# Patient Record
Sex: FEMALE | Race: BLACK OR AFRICAN AMERICAN | NOT HISPANIC OR LATINO | Employment: FULL TIME | RURAL
[De-identification: names, ages, dates, MRNs, and addresses within clinical notes are randomized per-mention and may not be internally consistent; named-entity substitution may affect disease eponyms.]

---

## 2020-05-30 ENCOUNTER — HISTORICAL (OUTPATIENT)
Dept: ADMINISTRATIVE | Facility: HOSPITAL | Age: 50
End: 2020-05-30

## 2020-06-24 ENCOUNTER — HISTORICAL (OUTPATIENT)
Dept: ADMINISTRATIVE | Facility: HOSPITAL | Age: 50
End: 2020-06-24

## 2020-08-31 ENCOUNTER — HISTORICAL (OUTPATIENT)
Dept: ADMINISTRATIVE | Facility: HOSPITAL | Age: 50
End: 2020-08-31

## 2021-04-16 ENCOUNTER — OFFICE VISIT (OUTPATIENT)
Dept: FAMILY MEDICINE | Facility: CLINIC | Age: 51
End: 2021-04-16
Payer: COMMERCIAL

## 2021-04-16 VITALS — RESPIRATION RATE: 17 BRPM | TEMPERATURE: 99 F | BODY MASS INDEX: 35.36 KG/M2 | WEIGHT: 220 LBS | HEIGHT: 66 IN

## 2021-04-16 DIAGNOSIS — R07.81 RIB PAIN ON LEFT SIDE: ICD-10-CM

## 2021-04-16 DIAGNOSIS — M94.0 COSTOCHONDRITIS: Primary | ICD-10-CM

## 2021-04-16 PROCEDURE — 3008F BODY MASS INDEX DOCD: CPT | Mod: CPTII,,, | Performed by: FAMILY MEDICINE

## 2021-04-16 PROCEDURE — 99213 OFFICE O/P EST LOW 20 MIN: CPT | Mod: 25,,, | Performed by: FAMILY MEDICINE

## 2021-04-16 PROCEDURE — 1125F PR PAIN SEVERITY QUANTIFIED, PAIN PRESENT: ICD-10-PCS | Mod: ,,, | Performed by: FAMILY MEDICINE

## 2021-04-16 PROCEDURE — 96372 PR INJECTION,THERAP/PROPH/DIAG2ST, IM OR SUBCUT: ICD-10-PCS | Mod: ,,, | Performed by: FAMILY MEDICINE

## 2021-04-16 PROCEDURE — 99213 PR OFFICE/OUTPT VISIT, EST, LEVL III, 20-29 MIN: ICD-10-PCS | Mod: 25,,, | Performed by: FAMILY MEDICINE

## 2021-04-16 PROCEDURE — 3008F PR BODY MASS INDEX (BMI) DOCUMENTED: ICD-10-PCS | Mod: CPTII,,, | Performed by: FAMILY MEDICINE

## 2021-04-16 PROCEDURE — 96372 THER/PROPH/DIAG INJ SC/IM: CPT | Mod: ,,, | Performed by: FAMILY MEDICINE

## 2021-04-16 PROCEDURE — 1125F AMNT PAIN NOTED PAIN PRSNT: CPT | Mod: ,,, | Performed by: FAMILY MEDICINE

## 2021-04-16 RX ORDER — DEXAMETHASONE SODIUM PHOSPHATE 4 MG/ML
4 INJECTION, SOLUTION INTRA-ARTICULAR; INTRALESIONAL; INTRAMUSCULAR; INTRAVENOUS; SOFT TISSUE
Status: COMPLETED | OUTPATIENT
Start: 2021-04-16 | End: 2021-04-16

## 2021-04-16 RX ORDER — IBUPROFEN 800 MG/1
800 TABLET ORAL 3 TIMES DAILY PRN
Qty: 20 TABLET | Refills: 0 | Status: SHIPPED | OUTPATIENT
Start: 2021-04-16 | End: 2022-02-21 | Stop reason: ALTCHOICE

## 2021-04-16 RX ORDER — PREDNISONE 20 MG/1
20 TABLET ORAL DAILY
Qty: 5 TABLET | Refills: 0 | Status: SHIPPED | OUTPATIENT
Start: 2021-04-16 | End: 2021-04-21

## 2021-04-16 RX ORDER — KETOROLAC TROMETHAMINE 30 MG/ML
60 INJECTION, SOLUTION INTRAMUSCULAR; INTRAVENOUS
Status: COMPLETED | OUTPATIENT
Start: 2021-04-16 | End: 2021-04-16

## 2021-04-16 RX ORDER — LISINOPRIL 10 MG/1
10 TABLET ORAL DAILY
COMMUNITY
End: 2021-06-04 | Stop reason: SDUPTHER

## 2021-04-16 RX ORDER — TIZANIDINE 4 MG/1
4 TABLET ORAL 3 TIMES DAILY PRN
Qty: 20 TABLET | Refills: 0 | Status: SHIPPED | OUTPATIENT
Start: 2021-04-16 | End: 2021-04-26

## 2021-04-16 RX ADMIN — KETOROLAC TROMETHAMINE 60 MG: 30 INJECTION, SOLUTION INTRAMUSCULAR; INTRAVENOUS at 04:04

## 2021-04-16 RX ADMIN — DEXAMETHASONE SODIUM PHOSPHATE 4 MG: 4 INJECTION, SOLUTION INTRA-ARTICULAR; INTRALESIONAL; INTRAMUSCULAR; INTRAVENOUS; SOFT TISSUE at 04:04

## 2021-06-04 ENCOUNTER — TELEPHONE (OUTPATIENT)
Dept: FAMILY MEDICINE | Facility: CLINIC | Age: 51
End: 2021-06-04

## 2021-06-04 ENCOUNTER — OFFICE VISIT (OUTPATIENT)
Dept: FAMILY MEDICINE | Facility: CLINIC | Age: 51
End: 2021-06-04
Payer: COMMERCIAL

## 2021-06-04 VITALS
DIASTOLIC BLOOD PRESSURE: 96 MMHG | BODY MASS INDEX: 37.77 KG/M2 | WEIGHT: 235 LBS | TEMPERATURE: 99 F | HEART RATE: 75 BPM | OXYGEN SATURATION: 96 % | HEIGHT: 66 IN | SYSTOLIC BLOOD PRESSURE: 160 MMHG

## 2021-06-04 DIAGNOSIS — R30.0 DYSURIA: Primary | ICD-10-CM

## 2021-06-04 DIAGNOSIS — N32.81 OVERACTIVE BLADDER: ICD-10-CM

## 2021-06-04 LAB
BACTERIA #/AREA URNS HPF: ABNORMAL /HPF
BILIRUB UR QL STRIP: NEGATIVE
CLARITY UR: ABNORMAL
COLOR UR: YELLOW
GLUCOSE UR STRIP-MCNC: NEGATIVE MG/DL
KETONES UR STRIP-SCNC: NEGATIVE MG/DL
LEUKOCYTE ESTERASE UR QL STRIP: ABNORMAL
NITRITE UR QL STRIP: NEGATIVE
PH UR STRIP: 6.5 PH UNITS
PROT UR QL STRIP: ABNORMAL
RBC # UR STRIP: ABNORMAL /UL
RBC #/AREA URNS HPF: ABNORMAL /HPF
SP GR UR STRIP: 1.01
SQUAMOUS #/AREA URNS LPF: ABNORMAL /LPF
UROBILINOGEN UR STRIP-ACNC: 0.2 MG/DL
WBC #/AREA URNS HPF: ABNORMAL /HPF

## 2021-06-04 PROCEDURE — 99214 PR OFFICE/OUTPT VISIT, EST, LEVL IV, 30-39 MIN: ICD-10-PCS | Mod: ,,, | Performed by: FAMILY MEDICINE

## 2021-06-04 PROCEDURE — 81001 URINALYSIS AUTO W/SCOPE: CPT | Mod: ,,, | Performed by: CLINICAL MEDICAL LABORATORY

## 2021-06-04 PROCEDURE — 3008F BODY MASS INDEX DOCD: CPT | Mod: CPTII,,, | Performed by: FAMILY MEDICINE

## 2021-06-04 PROCEDURE — 87086 CULTURE, URINE: ICD-10-PCS | Mod: ,,, | Performed by: CLINICAL MEDICAL LABORATORY

## 2021-06-04 PROCEDURE — 81001 URINALYSIS, REFLEX TO URINE CULTURE: ICD-10-PCS | Mod: ,,, | Performed by: CLINICAL MEDICAL LABORATORY

## 2021-06-04 PROCEDURE — 3008F PR BODY MASS INDEX (BMI) DOCUMENTED: ICD-10-PCS | Mod: CPTII,,, | Performed by: FAMILY MEDICINE

## 2021-06-04 PROCEDURE — 87186 CULTURE, URINE: ICD-10-PCS | Mod: ,,, | Performed by: CLINICAL MEDICAL LABORATORY

## 2021-06-04 PROCEDURE — 87077 CULTURE, URINE: ICD-10-PCS | Mod: ,,, | Performed by: CLINICAL MEDICAL LABORATORY

## 2021-06-04 PROCEDURE — 87186 SC STD MICRODIL/AGAR DIL: CPT | Mod: ,,, | Performed by: CLINICAL MEDICAL LABORATORY

## 2021-06-04 PROCEDURE — 87077 CULTURE AEROBIC IDENTIFY: CPT | Mod: ,,, | Performed by: CLINICAL MEDICAL LABORATORY

## 2021-06-04 PROCEDURE — 99214 OFFICE O/P EST MOD 30 MIN: CPT | Mod: ,,, | Performed by: FAMILY MEDICINE

## 2021-06-04 PROCEDURE — 87086 URINE CULTURE/COLONY COUNT: CPT | Mod: ,,, | Performed by: CLINICAL MEDICAL LABORATORY

## 2021-06-04 RX ORDER — OXYBUTYNIN CHLORIDE 5 MG/1
5 TABLET, EXTENDED RELEASE ORAL DAILY
Qty: 30 TABLET | Refills: 2 | Status: SHIPPED | OUTPATIENT
Start: 2021-06-04 | End: 2022-02-21 | Stop reason: ALTCHOICE

## 2021-06-04 RX ORDER — PHENAZOPYRIDINE HYDROCHLORIDE 100 MG/1
100 TABLET, FILM COATED ORAL 3 TIMES DAILY PRN
Qty: 12 TABLET | Refills: 0 | Status: SHIPPED | OUTPATIENT
Start: 2021-06-04 | End: 2022-02-21 | Stop reason: ALTCHOICE

## 2021-06-04 RX ORDER — LISINOPRIL 10 MG/1
10 TABLET ORAL DAILY
Qty: 90 TABLET | Refills: 0 | Status: SHIPPED | OUTPATIENT
Start: 2021-06-04 | End: 2022-02-21 | Stop reason: SDUPTHER

## 2021-06-06 LAB — UA COMPLETE W REFLEX CULTURE PNL UR: ABNORMAL

## 2021-06-07 ENCOUNTER — TELEPHONE (OUTPATIENT)
Dept: FAMILY MEDICINE | Facility: CLINIC | Age: 51
End: 2021-06-07

## 2021-06-07 RX ORDER — NITROFURANTOIN 25; 75 MG/1; MG/1
100 CAPSULE ORAL 2 TIMES DAILY
Qty: 14 CAPSULE | Refills: 0 | Status: SHIPPED | OUTPATIENT
Start: 2021-06-07 | End: 2022-02-21 | Stop reason: ALTCHOICE

## 2021-10-06 ENCOUNTER — HOSPITAL ENCOUNTER (OUTPATIENT)
Dept: RADIOLOGY | Facility: HOSPITAL | Age: 51
Discharge: HOME | End: 2021-10-06
Attending: ORTHOPAEDIC SURGERY
Payer: COMMERCIAL

## 2021-10-06 DIAGNOSIS — M25.562 ACUTE PAIN OF LEFT KNEE: ICD-10-CM

## 2021-10-06 PROBLEM — M23.92 INTERNAL DERANGEMENT OF LEFT KNEE: Status: ACTIVE | Noted: 2021-10-06

## 2021-10-06 PROCEDURE — 73564 X-RAY EXAM KNEE 4 OR MORE: CPT | Mod: TC,LT

## 2021-12-03 ENCOUNTER — OFFICE VISIT (OUTPATIENT)
Dept: FAMILY MEDICINE | Facility: CLINIC | Age: 51
End: 2021-12-03
Payer: COMMERCIAL

## 2021-12-03 VITALS
SYSTOLIC BLOOD PRESSURE: 160 MMHG | HEART RATE: 94 BPM | BODY MASS INDEX: 36.16 KG/M2 | DIASTOLIC BLOOD PRESSURE: 100 MMHG | OXYGEN SATURATION: 97 % | WEIGHT: 225 LBS | HEIGHT: 66 IN | TEMPERATURE: 97 F

## 2021-12-03 DIAGNOSIS — J32.9 SINUSITIS, UNSPECIFIED CHRONICITY, UNSPECIFIED LOCATION: ICD-10-CM

## 2021-12-03 DIAGNOSIS — K59.00 CONSTIPATION, UNSPECIFIED CONSTIPATION TYPE: Primary | ICD-10-CM

## 2021-12-03 DIAGNOSIS — R14.0 ABDOMINAL BLOATING: ICD-10-CM

## 2021-12-03 PROCEDURE — 4010F PR ACE/ARB THEARPY RXD/TAKEN: ICD-10-PCS | Mod: CPTII,,, | Performed by: NURSE PRACTITIONER

## 2021-12-03 PROCEDURE — 99213 OFFICE O/P EST LOW 20 MIN: CPT | Mod: ,,, | Performed by: NURSE PRACTITIONER

## 2021-12-03 PROCEDURE — 99213 PR OFFICE/OUTPT VISIT, EST, LEVL III, 20-29 MIN: ICD-10-PCS | Mod: ,,, | Performed by: NURSE PRACTITIONER

## 2021-12-03 PROCEDURE — 4010F ACE/ARB THERAPY RXD/TAKEN: CPT | Mod: CPTII,,, | Performed by: NURSE PRACTITIONER

## 2021-12-03 RX ORDER — CEFDINIR 300 MG/1
300 CAPSULE ORAL 2 TIMES DAILY
Qty: 20 CAPSULE | Refills: 0 | Status: SHIPPED | OUTPATIENT
Start: 2021-12-03 | End: 2021-12-13

## 2021-12-27 ENCOUNTER — CLINICAL SUPPORT (OUTPATIENT)
Dept: CARDIOLOGY | Facility: CLINIC | Age: 51
End: 2021-12-27
Payer: COMMERCIAL

## 2021-12-27 DIAGNOSIS — Z01.810 PRE-OPERATIVE CARDIOVASCULAR EXAMINATION: ICD-10-CM

## 2021-12-27 PROCEDURE — 93010 ELECTROCARDIOGRAM REPORT: CPT | Mod: S$PBB,,, | Performed by: INTERNAL MEDICINE

## 2021-12-27 PROCEDURE — 93010 EKG 12-LEAD: ICD-10-PCS | Mod: S$PBB,,, | Performed by: INTERNAL MEDICINE

## 2021-12-27 PROCEDURE — 93005 ELECTROCARDIOGRAM TRACING: CPT | Mod: PBBFAC | Performed by: INTERNAL MEDICINE

## 2021-12-27 PROCEDURE — 99212 OFFICE O/P EST SF 10 MIN: CPT | Mod: PBBFAC

## 2022-01-03 RX ORDER — COLCHICINE 0.6 MG/1
1 CAPSULE ORAL DAILY
COMMUNITY
Start: 2021-09-07 | End: 2022-02-21 | Stop reason: ALTCHOICE

## 2022-01-03 RX ORDER — METHYLPREDNISOLONE 4 MG/1
TABLET ORAL
COMMUNITY
Start: 2021-08-18 | End: 2022-02-21 | Stop reason: ALTCHOICE

## 2022-01-03 RX ORDER — UREA 40 %
CREAM (GRAM) TOPICAL
COMMUNITY
Start: 2021-09-09 | End: 2022-02-21 | Stop reason: ALTCHOICE

## 2022-01-03 RX ORDER — MELOXICAM 7.5 MG/1
7.5 TABLET ORAL DAILY
COMMUNITY
Start: 2021-09-07 | End: 2022-02-21 | Stop reason: ALTCHOICE

## 2022-01-04 ENCOUNTER — HOSPITAL ENCOUNTER (OUTPATIENT)
Facility: HOSPITAL | Age: 52
Discharge: HOME | End: 2022-01-04
Attending: ORTHOPAEDIC SURGERY | Admitting: ORTHOPAEDIC SURGERY
Payer: COMMERCIAL

## 2022-01-04 ENCOUNTER — ANESTHESIA EVENT (OUTPATIENT)
Dept: SURGERY | Facility: HOSPITAL | Age: 52
End: 2022-01-04
Payer: COMMERCIAL

## 2022-01-04 ENCOUNTER — ANESTHESIA (OUTPATIENT)
Dept: SURGERY | Facility: HOSPITAL | Age: 52
End: 2022-01-04
Payer: COMMERCIAL

## 2022-01-04 VITALS
BODY MASS INDEX: 38.12 KG/M2 | OXYGEN SATURATION: 100 % | SYSTOLIC BLOOD PRESSURE: 131 MMHG | HEART RATE: 93 BPM | TEMPERATURE: 97 F | DIASTOLIC BLOOD PRESSURE: 84 MMHG | WEIGHT: 237.19 LBS | RESPIRATION RATE: 16 BRPM | HEIGHT: 66 IN

## 2022-01-04 DIAGNOSIS — S83.242A ACUTE MEDIAL MENISCUS TEAR OF LEFT KNEE: ICD-10-CM

## 2022-01-04 PROCEDURE — 71000033 HC RECOVERY, INTIAL HOUR: Performed by: ORTHOPAEDIC SURGERY

## 2022-01-04 PROCEDURE — 27201423 OPTIME MED/SURG SUP & DEVICES STERILE SUPPLY: Performed by: ORTHOPAEDIC SURGERY

## 2022-01-04 PROCEDURE — D9220A PRA ANESTHESIA: Mod: ANES,,, | Performed by: ANESTHESIOLOGY

## 2022-01-04 PROCEDURE — 71000015 HC POSTOP RECOV 1ST HR: Performed by: ORTHOPAEDIC SURGERY

## 2022-01-04 PROCEDURE — 36000711: Performed by: ORTHOPAEDIC SURGERY

## 2022-01-04 PROCEDURE — 63600175 PHARM REV CODE 636 W HCPCS: Performed by: NURSE ANESTHETIST, CERTIFIED REGISTERED

## 2022-01-04 PROCEDURE — 97161 PT EVAL LOW COMPLEX 20 MIN: CPT

## 2022-01-04 PROCEDURE — 27100168 OPTIME MED/SURG SUP & DEVICES NON-STERILE SUPPLY: Performed by: ORTHOPAEDIC SURGERY

## 2022-01-04 PROCEDURE — D9220A PRA ANESTHESIA: Mod: CRNA,,, | Performed by: NURSE ANESTHETIST, CERTIFIED REGISTERED

## 2022-01-04 PROCEDURE — 71000016 HC POSTOP RECOV ADDL HR: Performed by: ORTHOPAEDIC SURGERY

## 2022-01-04 PROCEDURE — 37000009 HC ANESTHESIA EA ADD 15 MINS: Performed by: ORTHOPAEDIC SURGERY

## 2022-01-04 PROCEDURE — D9220A PRA ANESTHESIA: ICD-10-PCS | Mod: ANES,,, | Performed by: ANESTHESIOLOGY

## 2022-01-04 PROCEDURE — 25000003 PHARM REV CODE 250: Performed by: ANESTHESIOLOGY

## 2022-01-04 PROCEDURE — 25000003 PHARM REV CODE 250: Performed by: NURSE ANESTHETIST, CERTIFIED REGISTERED

## 2022-01-04 PROCEDURE — 36000710: Performed by: ORTHOPAEDIC SURGERY

## 2022-01-04 PROCEDURE — 25000003 PHARM REV CODE 250: Performed by: ORTHOPAEDIC SURGERY

## 2022-01-04 PROCEDURE — 37000008 HC ANESTHESIA 1ST 15 MINUTES: Performed by: ORTHOPAEDIC SURGERY

## 2022-01-04 PROCEDURE — 63600175 PHARM REV CODE 636 W HCPCS: Performed by: ORTHOPAEDIC SURGERY

## 2022-01-04 PROCEDURE — 63600175 PHARM REV CODE 636 W HCPCS: Performed by: ANESTHESIOLOGY

## 2022-01-04 PROCEDURE — D9220A PRA ANESTHESIA: ICD-10-PCS | Mod: CRNA,,, | Performed by: NURSE ANESTHETIST, CERTIFIED REGISTERED

## 2022-01-04 RX ORDER — MORPHINE SULFATE 10 MG/ML
4 INJECTION INTRAMUSCULAR; INTRAVENOUS; SUBCUTANEOUS EVERY 5 MIN PRN
Status: DISCONTINUED | OUTPATIENT
Start: 2022-01-04 | End: 2022-01-04 | Stop reason: HOSPADM

## 2022-01-04 RX ORDER — PROPOFOL 10 MG/ML
INJECTION, EMULSION INTRAVENOUS
Status: DISCONTINUED | OUTPATIENT
Start: 2022-01-04 | End: 2022-01-04

## 2022-01-04 RX ORDER — HYDROMORPHONE HYDROCHLORIDE 2 MG/ML
0.5 INJECTION, SOLUTION INTRAMUSCULAR; INTRAVENOUS; SUBCUTANEOUS EVERY 5 MIN PRN
Status: DISCONTINUED | OUTPATIENT
Start: 2022-01-04 | End: 2022-01-04 | Stop reason: HOSPADM

## 2022-01-04 RX ORDER — ONDANSETRON 2 MG/ML
INJECTION INTRAMUSCULAR; INTRAVENOUS
Status: DISCONTINUED | OUTPATIENT
Start: 2022-01-04 | End: 2022-01-04

## 2022-01-04 RX ORDER — ONDANSETRON 2 MG/ML
4 INJECTION INTRAMUSCULAR; INTRAVENOUS DAILY PRN
Status: DISCONTINUED | OUTPATIENT
Start: 2022-01-04 | End: 2022-01-04 | Stop reason: HOSPADM

## 2022-01-04 RX ORDER — EPINEPHRINE 1 MG/ML
INJECTION, SOLUTION INTRACARDIAC; INTRAMUSCULAR; INTRAVENOUS; SUBCUTANEOUS
Status: DISCONTINUED | OUTPATIENT
Start: 2022-01-04 | End: 2022-01-04 | Stop reason: HOSPADM

## 2022-01-04 RX ORDER — ONDANSETRON 4 MG/1
8 TABLET, ORALLY DISINTEGRATING ORAL EVERY 8 HOURS PRN
Status: DISCONTINUED | OUTPATIENT
Start: 2022-01-04 | End: 2022-01-04 | Stop reason: HOSPADM

## 2022-01-04 RX ORDER — LIDOCAINE HYDROCHLORIDE 10 MG/ML
1 INJECTION, SOLUTION EPIDURAL; INFILTRATION; INTRACAUDAL; PERINEURAL ONCE
Status: DISCONTINUED | OUTPATIENT
Start: 2022-01-04 | End: 2022-01-04 | Stop reason: HOSPADM

## 2022-01-04 RX ORDER — SODIUM CHLORIDE, SODIUM LACTATE, POTASSIUM CHLORIDE, CALCIUM CHLORIDE 600; 310; 30; 20 MG/100ML; MG/100ML; MG/100ML; MG/100ML
INJECTION, SOLUTION INTRAVENOUS CONTINUOUS
Status: DISCONTINUED | OUTPATIENT
Start: 2022-01-04 | End: 2022-01-04 | Stop reason: HOSPADM

## 2022-01-04 RX ORDER — HYDROCODONE BITARTRATE AND ACETAMINOPHEN 5; 325 MG/1; MG/1
1 TABLET ORAL EVERY 4 HOURS PRN
Status: DISCONTINUED | OUTPATIENT
Start: 2022-01-04 | End: 2022-01-04 | Stop reason: HOSPADM

## 2022-01-04 RX ORDER — LIDOCAINE HYDROCHLORIDE 20 MG/ML
INJECTION, SOLUTION EPIDURAL; INFILTRATION; INTRACAUDAL; PERINEURAL
Status: DISCONTINUED | OUTPATIENT
Start: 2022-01-04 | End: 2022-01-04

## 2022-01-04 RX ORDER — HYDRALAZINE HYDROCHLORIDE 20 MG/ML
10 INJECTION INTRAMUSCULAR; INTRAVENOUS ONCE
Status: COMPLETED | OUTPATIENT
Start: 2022-01-04 | End: 2022-01-04

## 2022-01-04 RX ORDER — LIDOCAINE HYDROCHLORIDE 10 MG/ML
1 INJECTION, SOLUTION EPIDURAL; INFILTRATION; INTRACAUDAL; PERINEURAL ONCE AS NEEDED
Status: DISCONTINUED | OUTPATIENT
Start: 2022-01-04 | End: 2022-01-04 | Stop reason: HOSPADM

## 2022-01-04 RX ORDER — CEFAZOLIN SODIUM 1 G/3ML
INJECTION, POWDER, FOR SOLUTION INTRAMUSCULAR; INTRAVENOUS
Status: DISCONTINUED | OUTPATIENT
Start: 2022-01-04 | End: 2022-01-04

## 2022-01-04 RX ORDER — HYDROCODONE BITARTRATE AND ACETAMINOPHEN 10; 325 MG/1; MG/1
1 TABLET ORAL EVERY 4 HOURS PRN
Status: DISCONTINUED | OUTPATIENT
Start: 2022-01-04 | End: 2022-01-04 | Stop reason: HOSPADM

## 2022-01-04 RX ORDER — PROMETHAZINE HYDROCHLORIDE 25 MG/1
25 TABLET ORAL EVERY 6 HOURS PRN
Status: DISCONTINUED | OUTPATIENT
Start: 2022-01-04 | End: 2022-01-04 | Stop reason: HOSPADM

## 2022-01-04 RX ORDER — SODIUM CHLORIDE 9 MG/ML
INJECTION, SOLUTION INTRAVENOUS CONTINUOUS
Status: DISCONTINUED | OUTPATIENT
Start: 2022-01-04 | End: 2022-01-04 | Stop reason: HOSPADM

## 2022-01-04 RX ORDER — EPINEPHRINE 1 MG/ML
INJECTION INTRAMUSCULAR; INTRAVENOUS; SUBCUTANEOUS
Status: DISCONTINUED | OUTPATIENT
Start: 2022-01-04 | End: 2022-01-04 | Stop reason: HOSPADM

## 2022-01-04 RX ORDER — MIDAZOLAM HYDROCHLORIDE 1 MG/ML
INJECTION INTRAMUSCULAR; INTRAVENOUS
Status: DISCONTINUED | OUTPATIENT
Start: 2022-01-04 | End: 2022-01-04

## 2022-01-04 RX ORDER — MEPERIDINE HYDROCHLORIDE 25 MG/ML
25 INJECTION INTRAMUSCULAR; INTRAVENOUS; SUBCUTANEOUS EVERY 10 MIN PRN
Status: DISCONTINUED | OUTPATIENT
Start: 2022-01-04 | End: 2022-01-04 | Stop reason: HOSPADM

## 2022-01-04 RX ORDER — DIPHENHYDRAMINE HYDROCHLORIDE 50 MG/ML
25 INJECTION INTRAMUSCULAR; INTRAVENOUS EVERY 6 HOURS PRN
Status: DISCONTINUED | OUTPATIENT
Start: 2022-01-04 | End: 2022-01-04 | Stop reason: HOSPADM

## 2022-01-04 RX ORDER — CEFAZOLIN SODIUM 2 G/50ML
2 SOLUTION INTRAVENOUS
Status: DISCONTINUED | OUTPATIENT
Start: 2022-01-04 | End: 2022-01-04 | Stop reason: HOSPADM

## 2022-01-04 RX ORDER — ACETAMINOPHEN 500 MG
1000 TABLET ORAL EVERY 6 HOURS PRN
Status: DISCONTINUED | OUTPATIENT
Start: 2022-01-04 | End: 2022-01-04 | Stop reason: HOSPADM

## 2022-01-04 RX ORDER — HYDROCODONE BITARTRATE AND ACETAMINOPHEN 10; 325 MG/1; MG/1
1 TABLET ORAL EVERY 6 HOURS PRN
Qty: 28 TABLET | Refills: 0 | Status: SHIPPED | OUTPATIENT
Start: 2022-01-04 | End: 2022-06-09 | Stop reason: ALTCHOICE

## 2022-01-04 RX ORDER — LABETALOL HYDROCHLORIDE 5 MG/ML
7.5 INJECTION, SOLUTION INTRAVENOUS ONCE
Status: COMPLETED | OUTPATIENT
Start: 2022-01-04 | End: 2022-01-04

## 2022-01-04 RX ORDER — FENTANYL CITRATE 50 UG/ML
INJECTION, SOLUTION INTRAMUSCULAR; INTRAVENOUS
Status: DISCONTINUED | OUTPATIENT
Start: 2022-01-04 | End: 2022-01-04

## 2022-01-04 RX ORDER — BUPIVACAINE HYDROCHLORIDE 2.5 MG/ML
INJECTION, SOLUTION EPIDURAL; INFILTRATION; INTRACAUDAL
Status: DISCONTINUED | OUTPATIENT
Start: 2022-01-04 | End: 2022-01-04 | Stop reason: HOSPADM

## 2022-01-04 RX ADMIN — PROPOFOL 150 MG: 10 INJECTION, EMULSION INTRAVENOUS at 01:01

## 2022-01-04 RX ADMIN — FENTANYL CITRATE 100 MCG: 50 INJECTION INTRAMUSCULAR; INTRAVENOUS at 01:01

## 2022-01-04 RX ADMIN — SODIUM CHLORIDE: 9 INJECTION, SOLUTION INTRAVENOUS at 10:01

## 2022-01-04 RX ADMIN — SODIUM CHLORIDE, POTASSIUM CHLORIDE, SODIUM LACTATE AND CALCIUM CHLORIDE: 600; 310; 30; 20 INJECTION, SOLUTION INTRAVENOUS at 01:01

## 2022-01-04 RX ADMIN — FENTANYL CITRATE 100 MCG: 50 INJECTION INTRAMUSCULAR; INTRAVENOUS at 02:01

## 2022-01-04 RX ADMIN — CEFAZOLIN 2 G: 1 INJECTION, POWDER, FOR SOLUTION INTRAMUSCULAR; INTRAVENOUS; PARENTERAL at 01:01

## 2022-01-04 RX ADMIN — HYDRALAZINE HYDROCHLORIDE 10 MG: 20 INJECTION INTRAMUSCULAR; INTRAVENOUS at 03:01

## 2022-01-04 RX ADMIN — MORPHINE SULFATE 4 MG: 10 INJECTION INTRAVENOUS at 03:01

## 2022-01-04 RX ADMIN — HYDROMORPHONE HYDROCHLORIDE 0.5 MG: 2 INJECTION INTRAMUSCULAR; INTRAVENOUS; SUBCUTANEOUS at 03:01

## 2022-01-04 RX ADMIN — PROPOFOL 100 MG: 10 INJECTION, EMULSION INTRAVENOUS at 02:01

## 2022-01-04 RX ADMIN — MORPHINE SULFATE 2 MG: 10 INJECTION INTRAVENOUS at 03:01

## 2022-01-04 RX ADMIN — LIDOCAINE HYDROCHLORIDE 100 MG: 20 INJECTION, SOLUTION INTRAVENOUS at 01:01

## 2022-01-04 RX ADMIN — MORPHINE SULFATE 4 MG: 10 INJECTION INTRAVENOUS at 02:01

## 2022-01-04 RX ADMIN — MIDAZOLAM HYDROCHLORIDE 2 MG: 1 INJECTION, SOLUTION INTRAMUSCULAR; INTRAVENOUS at 01:01

## 2022-01-04 RX ADMIN — LABETALOL HYDROCHLORIDE 7.5 MG: 5 INJECTION INTRAVENOUS at 03:01

## 2022-01-04 RX ADMIN — ONDANSETRON 4 MG: 2 INJECTION INTRAMUSCULAR; INTRAVENOUS at 02:01

## 2022-01-04 NOTE — BRIEF OP NOTE
"[facility] - Orthopedic Periop Services  Brief Operative Note    Surgery Date: 1/4/2022     Surgeon(s) and Role:     * Joseph Gonzalez MD - Primary    Assisting Surgeon: None    Pre-op Diagnosis:  Internal derangement of left knee [M23.92]  Tear of medial meniscus of left knee, unspecified tear type, unspecified whether old or current tear, initial encounter [S83.242A]    Post-op Diagnosis:  Post-Op Diagnosis Codes:     * Internal derangement of left knee [M23.92]     * Tear of medial meniscus of left knee, unspecified tear type, unspecified whether old or current tear, initial encounter [S83.242A]    Procedure(s) (LRB):  ARTHROSCOPY, KNEE (Left)  MENISCECTOMY, KNEE, MEDIAL LATERAL (Left)    Anesthesia: Choice    Operative Findings:  Patient admitted the hospital on 01/04/2022 and underwent a left knee arthroscopy.  DC home.  Can wash wound in 2 days.  Follow-up 2 weeks.    Estimated Blood Loss: * No values recorded between 1/4/2022  2:08 PM and 1/4/2022  2:38 PM *         Specimens:   Specimen (24h ago, onward)            None            Discharge Note    OUTCOME: Patient tolerated treatment/procedure well without complication and is now ready for discharge.    DISPOSITION: Home or Self Care    FINAL DIAGNOSIS:  Acute medial meniscus tear of left knee    FOLLOWUP: In clinic    DISCHARGE INSTRUCTIONS:    Discharge Procedure Orders   CRUTCHES FOR HOME USE     Order Specific Question Answer Comments   Type: Axillary    Height: 5' 6" (1.676 m)    Weight: 107.6 kg (237 lb 3.2 oz)    Length of need (1-99 months): 2      Diet general     Keep surgical extremity elevated     Ice to affected area   Order Comments: using barrier between ice and skin (specify duration&frequency)     Remove dressing in 72 hours   Order Comments: Keep dressing in place for 72 hours     Change dressing (specify)   Order Comments: Dressing change: one time per day beginning 72 hours post op.     Call MD for:  temperature >100.4     Call MD for:  " persistent nausea and vomiting     Call MD for:  severe uncontrolled pain     Call MD for:  difficulty breathing, headache or visual disturbances     Call MD for:  redness, tenderness, or signs of infection (pain, swelling, redness, odor or green/yellow discharge around incision site)     Call MD for:  hives     Call MD for:  persistent dizziness or light-headedness     Call MD for:  extreme fatigue     Activity as tolerated     Shower on day dressing removed (No bath)     Weight bearing as tolerated

## 2022-01-04 NOTE — OP NOTE
[facility] - Orthopedic Periop Services  General Surgery  Operative Note    SUMMARY     Date of Procedure: 1/4/2022     Procedure: Procedure(s) (LRB):  ARTHROSCOPY, KNEE (Left)  MENISCECTOMY, KNEE, MEDIAL LATERAL (Left)       Surgeon(s) and Role:     * Joseph Gonzalez MD - Primary    Assisting Surgeon: None    Pre-Operative Diagnosis: Internal derangement of left knee [M23.92]  Tear of medial meniscus of left knee, unspecified tear type, unspecified whether old or current tear, initial encounter [S83.242A]    Post-Operative Diagnosis: Post-Op Diagnosis Codes:     * Internal derangement of left knee [M23.92]     * Tear of medial meniscus of left knee, unspecified tear type, unspecified whether old or current tear, initial encounter [S83.242A]    Anesthesia: Choice    Operative Findings (including complications, if any):  After risks and benefits of procedure explained at length the patient stating she understands risks benefits wished to proceed with the procedure informed consent was obtained patient was taken operating room placed in supine position on operative table.  At this time anesthesia was induced per Anesthesia.  At this time left leg had a tourniquet placed over cast padding on proximal left thigh right lower extremity had a pillow placed into the knee at this time for the table was let down.  Left lower extremity prepped draped sterile fashion.  Inferior medial and inferior lateral portals were marked on the skin injected with 0.25% Marcaine with epinephrine portals were marked at the inferior border the patella mediolateral borders of patellar tendon.  At this time stab incision was made at the inferior lateral portal blunt trocar used to introduce the cannula into the knee camera introduced in knee inflated with saline.  Knee was inspected.  Patellas track is normal position on the femur.  There was some mild grade 1-2 chondromalacia changes a lateral facet of the patella.  Medial gutter no pathology  medial joint line there was some grade 2 chondromalacia medial femoral condyle there was tear complex from the body the posterior horn the medial meniscus ACL PCL popliteus tendons are intact lateral meniscus was noted have a tear of the anterior horn body lateral meniscus medial portal made under direct visualization placing a needle followed by stab incision probe placed in mediolateral meniscus tears confirmed chondromalacia confirmed ACL PCL popliteus tendons were intact.  At this time biter was placed in medial compartment medial meniscus tear debrided shaver used to remove any debrided material and smooth edge the meniscus tear.  At this time probe placed in the further tear noted medially.  Biter placed in lateral compartment and lateral meniscus debrided shaver used to remove any debrided material smooth edge probe placed in the further tears knee was inspected no further pathology instruments fluid removed portals closed with 4-0 nylon horizontal mattress suture sterile occlusive dressing placed patient was awakened taken recovery in good condition all counts correct no complications    Description of Technical Procedures:     Significant Surgical Tasks Conducted by the Assistant(s), if Applicable:     Estimated Blood Loss (EBL): * No values recorded between 1/4/2022  2:08 PM and 1/4/2022  2:38 PM *           Implants: * No implants in log *    Specimens:   Specimen (24h ago, onward)            None                  Condition: Good    Disposition: PACU - hemodynamically stable.    Attestation: I was present and scrubbed for the entire procedure.

## 2022-01-04 NOTE — ANESTHESIA PROCEDURE NOTES
Intubation  Performed by: Greyson Santillan CRNA  Authorized by: Gregor Cabral MD     Intubation:     Induction:  Intravenous    Intubated:  Postinduction    Mask Ventilation:  Easy mask    Attempts:  1    Attempted By:  CRNA    Difficult Airway Encountered?: No      Complications:  None    Airway Device:  Supraglottic airway/LMA    Airway Device Size:  4.0    Style/Cuff Inflation:  Cuffed (inflated to minimal occlusive pressure)    Placement Verified By:  Capnometry    Complicating Factors:  None    Findings Post-Intubation:  BS equal bilateral

## 2022-01-04 NOTE — TRANSFER OF CARE
"Anesthesia Transfer of Care Note    Patient: Samara Hurt Agusto    Procedure(s) Performed: Procedure(s) (LRB):  ARTHROSCOPY, KNEE (Left)  MENISCECTOMY, KNEE, MEDIAL LATERAL (Left)    Patient location: PACU    Anesthesia Type: general    Transport from OR: Transported from OR on 6-10 L/min O2 by face mask with adequate spontaneous ventilation    Post pain: adequate analgesia    Post assessment: no apparent anesthetic complications    Post vital signs: stable    Level of consciousness: sedated and awake    Nausea/Vomiting: no nausea/vomiting    Complications: none    Transfer of care protocol was followed      Last vitals:   Visit Vitals  BP (!) 142/77   Pulse 88   Temp 36.6 °C (97.9 °F)   Resp 16   Ht 5' 6" (1.676 m)   Wt 107.6 kg (237 lb 3.2 oz)   SpO2 (!) 94%   Breastfeeding No   BMI 38.29 kg/m²     "

## 2022-01-04 NOTE — ANESTHESIA POSTPROCEDURE EVALUATION
Anesthesia Post Evaluation    Patient: Samara Liz    Procedure(s) Performed: Procedure(s) (LRB):  ARTHROSCOPY, KNEE (Left)  MENISCECTOMY, KNEE, MEDIAL LATERAL (Left)    Final Anesthesia Type: general      Patient location during evaluation: PACU  Post-procedure vital signs: reviewed and stable  Pain management: adequate  Airway patency: patent  MIGNON mitigation strategies: Extubation and recovery carried out in lateral, semiupright, or other nonsupine position  PONV status at discharge: No PONV  Anesthetic complications: no      Cardiovascular status: hemodynamically stable  Respiratory status: unassisted  Hydration status: euvolemic  Follow-up not needed.          Vitals Value Taken Time   /85 01/04/22 1559   Temp 36.6 °C (97.9 °F) 01/04/22 1445   Pulse 84 01/04/22 1559   Resp 18 01/04/22 1540   SpO2 90 % 01/04/22 1559   Vitals shown include unvalidated device data.      Event Time   Out of Recovery 15:33:02         Pain/Mari Score: Pain Rating Prior to Med Admin: 8 (1/4/2022  3:16 PM)  Mari Score: 10 (1/4/2022  3:25 PM)

## 2022-01-04 NOTE — H&P
[facility] - Orthopedic Periop Services  Orthopedics  H&P    Patient Name: Samara Liz  MRN: [id number]  Admission Date: (Not on file)  Primary Care Provider: Primary Doctor No    Patient information was obtained from patient and past medical records.     Subjective:     Principal Problem:<principal problem not specified>    Chief Complaint: No chief complaint on file.       HPI: 51-year-old female with left knee medial meniscus tear needing arthroscopic evaluation debridement possible repair left knee  Left lower extremity she moves her toes has sensation to touch has palpable pulses tender palpation of the posterior medial joint line pain on Lita's testing no instability on varus valgus stress of the knee noted  X-rays show no fracture subluxation left knee MRI shows medial meniscus tear left knee with popliteal cyst  Impression medial meniscus tear left knee  Plan arthroscopic evaluation debridement possible repair left knee      Past Medical History:   Diagnosis Date    Hypertension        Past Surgical History:   Procedure Laterality Date    HYSTERECTOMY         Review of patient's allergies indicates:  No Known Allergies    No current facility-administered medications for this encounter.     Current Outpatient Medications   Medication Sig    colchicine (MITIGARE) 0.6 mg Cap Take 1 capsule by mouth once daily.    ibuprofen (ADVIL,MOTRIN) 800 MG tablet Take 1 tablet (800 mg total) by mouth 3 (three) times daily as needed for Pain. (Patient not taking: Reported on 6/4/2021)    lisinopriL 10 MG tablet Take 1 tablet (10 mg total) by mouth once daily.    meloxicam (MOBIC) 7.5 MG tablet Take 7.5 mg by mouth once daily.    methylPREDNISolone (MEDROL DOSEPACK) 4 mg tablet FOLLOW PACKAGE DIRECTIONS    nitrofurantoin, macrocrystal-monohydrate, (MACROBID) 100 MG capsule Take 1 capsule (100 mg total) by mouth 2 (two) times daily. (Patient not taking: Reported on 12/3/2021)    oxybutynin (DITROPAN-XL) 5  MG TR24 Take 1 tablet (5 mg total) by mouth once daily.    phenazopyridine (PYRIDIUM) 100 MG tablet Take 1 tablet (100 mg total) by mouth 3 (three) times daily as needed for Pain. (Patient not taking: Reported on 12/3/2021)    traMADoL (ULTRAM) 50 mg tablet Take 1 tablet (50 mg total) by mouth every 6 (six) hours.    urea (CARMOL) 40 % Crea USE CREAM TWICE DAILY TO HEELS     Family History     Problem Relation (Age of Onset)    Diabetes Mother, Sister    Hypertension Sister        Tobacco Use    Smoking status: Never Smoker    Smokeless tobacco: Never Used   Substance and Sexual Activity    Alcohol use: Never    Drug use: Never    Sexual activity: Not on file     Review of Systems   Constitutional: Negative for decreased appetite.   HENT: Negative for congestion and stridor.    Respiratory: Negative for cough and wheezing.    Endocrine: Negative for cold intolerance.   Hematologic/Lymphatic: Negative for adenopathy.   Skin: Negative for color change and suspicious lesions.   Musculoskeletal: Positive for joint pain. Negative for muscle cramps and neck pain.   Gastrointestinal: Negative for abdominal pain and hematemesis.   Genitourinary: Negative for dysuria and hematuria.   Neurological: Negative for brief paralysis, focal weakness and weakness.   Psychiatric/Behavioral: Negative for altered mental status and suicidal ideas.   Allergic/Immunologic: Negative for hives.     Objective:     Vital Signs (Most Recent):    Vital Signs (24h Range):  BP: ()/()   Arterial Line BP: ()/()            There is no height or weight on file to calculate BMI.    No intake or output data in the 24 hours ending 01/04/22 0906                Left Knee Exam     Tenderness   The patient tender to palpation of the medial joint line.    Tests   Meniscus   Lita:  Medial - positive     Other   Sensation: normal    Vascular Exam       Left Pulses  Dorsalis Pedis:      2+              Significant Labs: All pertinent labs within the  past 24 hours have been reviewed.    Significant Imaging: MRI: I have reviewed all pertinent results/findings and my personal findings are:  Medial meniscus tear left knee    Assessment/Plan:   Impression medial meniscus tear left knee  Plan arthroscopic evaluation debridement possible repair left knee  No notes have been filed under this hospital service.  Service: Orthopedic Surgery      Joseph Gonzalez MD  Orthopedics  [facility] - Orthopedic Periop Services

## 2022-01-04 NOTE — SUBJECTIVE & OBJECTIVE
Past Medical History:   Diagnosis Date    Hypertension        Past Surgical History:   Procedure Laterality Date    HYSTERECTOMY         Review of patient's allergies indicates:  No Known Allergies    No current facility-administered medications for this encounter.     Current Outpatient Medications   Medication Sig    colchicine (MITIGARE) 0.6 mg Cap Take 1 capsule by mouth once daily.    ibuprofen (ADVIL,MOTRIN) 800 MG tablet Take 1 tablet (800 mg total) by mouth 3 (three) times daily as needed for Pain. (Patient not taking: Reported on 6/4/2021)    lisinopriL 10 MG tablet Take 1 tablet (10 mg total) by mouth once daily.    meloxicam (MOBIC) 7.5 MG tablet Take 7.5 mg by mouth once daily.    methylPREDNISolone (MEDROL DOSEPACK) 4 mg tablet FOLLOW PACKAGE DIRECTIONS    nitrofurantoin, macrocrystal-monohydrate, (MACROBID) 100 MG capsule Take 1 capsule (100 mg total) by mouth 2 (two) times daily. (Patient not taking: Reported on 12/3/2021)    oxybutynin (DITROPAN-XL) 5 MG TR24 Take 1 tablet (5 mg total) by mouth once daily.    phenazopyridine (PYRIDIUM) 100 MG tablet Take 1 tablet (100 mg total) by mouth 3 (three) times daily as needed for Pain. (Patient not taking: Reported on 12/3/2021)    traMADoL (ULTRAM) 50 mg tablet Take 1 tablet (50 mg total) by mouth every 6 (six) hours.    urea (CARMOL) 40 % Crea USE CREAM TWICE DAILY TO HEELS     Family History     Problem Relation (Age of Onset)    Diabetes Mother, Sister    Hypertension Sister        Tobacco Use    Smoking status: Never Smoker    Smokeless tobacco: Never Used   Substance and Sexual Activity    Alcohol use: Never    Drug use: Never    Sexual activity: Not on file     Review of Systems   Constitutional: Negative for decreased appetite.   HENT: Negative for congestion and stridor.    Respiratory: Negative for cough and wheezing.    Endocrine: Negative for cold intolerance.   Hematologic/Lymphatic: Negative for adenopathy.   Skin: Negative for  color change and suspicious lesions.   Musculoskeletal: Positive for joint pain. Negative for muscle cramps and neck pain.   Gastrointestinal: Negative for abdominal pain and hematemesis.   Genitourinary: Negative for dysuria and hematuria.   Neurological: Negative for brief paralysis, focal weakness and weakness.   Psychiatric/Behavioral: Negative for altered mental status and suicidal ideas.   Allergic/Immunologic: Negative for hives.     Objective:     Vital Signs (Most Recent):    Vital Signs (24h Range):  BP: ()/()   Arterial Line BP: ()/()            There is no height or weight on file to calculate BMI.    No intake or output data in the 24 hours ending 01/04/22 0906                Left Knee Exam     Tenderness   The patient tender to palpation of the medial joint line.    Tests   Meniscus   Lita:  Medial - positive     Other   Sensation: normal    Vascular Exam       Left Pulses  Dorsalis Pedis:      2+              Significant Labs: All pertinent labs within the past 24 hours have been reviewed.    Significant Imaging: MRI: I have reviewed all pertinent results/findings and my personal findings are:  Medial meniscus tear left knee

## 2022-01-04 NOTE — INTERVAL H&P NOTE
The patient has been examined and the H&P has been reviewed:    I concur with the findings and no changes have occurred since H&P was written.    Surgery risks, benefits and alternative options discussed and understood by patient/family.          Active Hospital Problems    Diagnosis  POA    *Acute medial meniscus tear of left knee [S83.301A]  Yes      Resolved Hospital Problems   No resolved problems to display.

## 2022-01-04 NOTE — OR NURSING
1440 received from OR    1510 dr pardo at bedside; orders received.   1533 recovery complete, return to asc 22    1540 report given to jas solorio rn. 94% room air; 161/101; 90; 20. Family at bedside.

## 2022-01-04 NOTE — HPI
51-year-old female with left knee medial meniscus tear needing arthroscopic evaluation debridement possible repair left knee  Left lower extremity she moves her toes has sensation to touch has palpable pulses tender palpation of the posterior medial joint line pain on Lita's testing no instability on varus valgus stress of the knee noted  X-rays show no fracture subluxation left knee MRI shows medial meniscus tear left knee with popliteal cyst  Impression medial meniscus tear left knee  Plan arthroscopic evaluation debridement possible repair left knee

## 2022-01-04 NOTE — ANESTHESIA PREPROCEDURE EVALUATION
01/04/2022  Samara Pateln is a 51 y.o., female.    Anesthesia Evaluation    I have reviewed the Patient Summary Reports.   I have reviewed the NPO Status.   I have reviewed the Medications.     Review of Systems         Anesthesia Plan  Type of Anesthesia, risks & benefits discussed:  Anesthesia Type:  general    Patient's Preference:   Plan Factors:          Intra-op Monitoring Plan: standard ASA monitors  Intra-op Monitoring Plan Comments:   Post Op Pain Control Plan: IV/PO Opioids PRN  Post Op Pain Control Plan Comments:     Induction:   IV  Beta Blocker:         Informed Consent: Patient understands risks and agrees with Anesthesia plan.  Questions answered. Anesthesia consent signed with patient.  ASA Score: 2     Day of Surgery Review of History & Physical:            Ready For Surgery From Anesthesia Perspective.     NPO greater than 8 hours  NAC  NKDA    12/27/21 EKG: NSR 87    Hypertension Arthritis   Obesity (BMI 38)    Airway exam deferred (COVID precautions); adequate ROM at neck.

## 2022-01-06 NOTE — PLAN OF CARE
Problem: Physical Therapy Goal  Goal: Physical Therapy Goal  Description: Short Term Goals  Independent with HEP  Independent with crutchesx 10 feet FWB/WBAT: left lower extremity    Long term goals  Needed equipment for home.       Outcome: Met

## 2022-01-19 PROBLEM — M23.92 INTERNAL DERANGEMENT OF LEFT KNEE: Status: RESOLVED | Noted: 2021-10-06 | Resolved: 2022-01-19

## 2022-02-21 ENCOUNTER — OFFICE VISIT (OUTPATIENT)
Dept: FAMILY MEDICINE | Facility: CLINIC | Age: 52
End: 2022-02-21
Payer: COMMERCIAL

## 2022-02-21 VITALS
TEMPERATURE: 98 F | BODY MASS INDEX: 38.41 KG/M2 | DIASTOLIC BLOOD PRESSURE: 98 MMHG | RESPIRATION RATE: 20 BRPM | HEIGHT: 66 IN | HEART RATE: 79 BPM | OXYGEN SATURATION: 98 % | SYSTOLIC BLOOD PRESSURE: 134 MMHG | WEIGHT: 239 LBS

## 2022-02-21 DIAGNOSIS — Z12.11 SCREENING FOR COLON CANCER: ICD-10-CM

## 2022-02-21 DIAGNOSIS — M54.6 CHRONIC MIDLINE THORACIC BACK PAIN: ICD-10-CM

## 2022-02-21 DIAGNOSIS — I10 HYPERTENSION, UNSPECIFIED TYPE: Primary | ICD-10-CM

## 2022-02-21 DIAGNOSIS — R53.83 FATIGUE, UNSPECIFIED TYPE: ICD-10-CM

## 2022-02-21 DIAGNOSIS — G89.29 CHRONIC MIDLINE THORACIC BACK PAIN: ICD-10-CM

## 2022-02-21 DIAGNOSIS — Z12.39 ENCOUNTER FOR SCREENING FOR MALIGNANT NEOPLASM OF BREAST, UNSPECIFIED SCREENING MODALITY: ICD-10-CM

## 2022-02-21 DIAGNOSIS — Z13.1 SCREENING FOR DIABETES MELLITUS: ICD-10-CM

## 2022-02-21 DIAGNOSIS — Z13.220 SCREENING FOR LIPOID DISORDERS: ICD-10-CM

## 2022-02-21 PROCEDURE — 3080F PR MOST RECENT DIASTOLIC BLOOD PRESSURE >= 90 MM HG: ICD-10-PCS | Mod: CPTII,,, | Performed by: NURSE PRACTITIONER

## 2022-02-21 PROCEDURE — 99214 PR OFFICE/OUTPT VISIT, EST, LEVL IV, 30-39 MIN: ICD-10-PCS | Mod: ,,, | Performed by: NURSE PRACTITIONER

## 2022-02-21 PROCEDURE — 4010F PR ACE/ARB THEARPY RXD/TAKEN: ICD-10-PCS | Mod: CPTII,,, | Performed by: NURSE PRACTITIONER

## 2022-02-21 PROCEDURE — 3008F BODY MASS INDEX DOCD: CPT | Mod: CPTII,,, | Performed by: NURSE PRACTITIONER

## 2022-02-21 PROCEDURE — 4010F ACE/ARB THERAPY RXD/TAKEN: CPT | Mod: CPTII,,, | Performed by: NURSE PRACTITIONER

## 2022-02-21 PROCEDURE — 3008F PR BODY MASS INDEX (BMI) DOCUMENTED: ICD-10-PCS | Mod: CPTII,,, | Performed by: NURSE PRACTITIONER

## 2022-02-21 PROCEDURE — 1159F PR MEDICATION LIST DOCUMENTED IN MEDICAL RECORD: ICD-10-PCS | Mod: CPTII,,, | Performed by: NURSE PRACTITIONER

## 2022-02-21 PROCEDURE — 99214 OFFICE O/P EST MOD 30 MIN: CPT | Mod: ,,, | Performed by: NURSE PRACTITIONER

## 2022-02-21 PROCEDURE — 3080F DIAST BP >= 90 MM HG: CPT | Mod: CPTII,,, | Performed by: NURSE PRACTITIONER

## 2022-02-21 PROCEDURE — 3075F SYST BP GE 130 - 139MM HG: CPT | Mod: CPTII,,, | Performed by: NURSE PRACTITIONER

## 2022-02-21 PROCEDURE — 1160F RVW MEDS BY RX/DR IN RCRD: CPT | Mod: CPTII,,, | Performed by: NURSE PRACTITIONER

## 2022-02-21 PROCEDURE — 3075F PR MOST RECENT SYSTOLIC BLOOD PRESS GE 130-139MM HG: ICD-10-PCS | Mod: CPTII,,, | Performed by: NURSE PRACTITIONER

## 2022-02-21 PROCEDURE — 1160F PR REVIEW ALL MEDS BY PRESCRIBER/CLIN PHARMACIST DOCUMENTED: ICD-10-PCS | Mod: CPTII,,, | Performed by: NURSE PRACTITIONER

## 2022-02-21 PROCEDURE — 1159F MED LIST DOCD IN RCRD: CPT | Mod: CPTII,,, | Performed by: NURSE PRACTITIONER

## 2022-02-21 RX ORDER — LISINOPRIL 10 MG/1
10 TABLET ORAL DAILY
Qty: 90 TABLET | Refills: 0 | Status: SHIPPED | OUTPATIENT
Start: 2022-02-21 | End: 2022-06-09 | Stop reason: SDUPTHER

## 2022-02-21 NOTE — PROGRESS NOTES
Subjective:       Patient ID: Samara Liz is a 51 y.o. female.    Chief Complaint: Establish Care (Reports nonfasting), Tingling Sensation (Reports in RLE x 1 year, denies pain at this time), and Muscle Spasm (Reports in thoracic region x 3 years)    Ms. Liz presents to clinic to establish care, she notes sh ehad a recent left knee surgery and is currently in PT. She complains of right inner thigh numbness that stops at the knee. She complains of midline thoracic pain that comes and goes, she notes it comes on suddenly and moves up her spine. She denies neck pain, states she occasionally has lower back pain. She reports a history of hypertension and fatigue. She reports a strong family h/o DMT2 and HTN. She is a nonsmoker, states she has gained a good bit of weight in the last one year. She has an upcoming follow up with orthopedics for her left knee, she complains of medial pain. She is not up to date on colonoscopy or mammogram, she reports she had a complete hysterectomy a few years ago due to vaginal bleeding, notes she has not had a pelvic exam since that time. Unfortunately she has not been working since before her knee surgery, she does hope to return.    Review of Systems   Constitutional: Positive for activity change and fatigue. Negative for appetite change, chills and fever.   HENT: Negative.    Respiratory: Negative.    Cardiovascular: Negative.    Gastrointestinal: Negative.    Genitourinary: Negative.    Musculoskeletal: Positive for arthralgias (left knee), back pain (thoracic pain) and joint swelling.   Neurological: Positive for numbness (right inner thigh to knee).   Psychiatric/Behavioral: Negative for dysphoric mood. The patient is not nervous/anxious.          Objective:      Physical Exam  Vitals and nursing note reviewed.   Constitutional:       Appearance: Normal appearance.   HENT:      Head: Normocephalic.   Eyes:      Pupils: Pupils are equal, round, and reactive to light.    Cardiovascular:      Rate and Rhythm: Normal rate and regular rhythm.      Pulses: Normal pulses.      Heart sounds: Normal heart sounds.   Pulmonary:      Effort: Pulmonary effort is normal.      Breath sounds: Normal breath sounds.   Abdominal:      General: Bowel sounds are normal.      Palpations: Abdomen is soft.   Musculoskeletal:         General: Tenderness (left knee, mid thoracic spine midline) present.      Thoracic back: No spasms, tenderness or bony tenderness. Normal range of motion. No scoliosis.      Lumbar back: No tenderness. Normal range of motion. Negative right straight leg raise test and negative left straight leg raise test.      Left knee: Swelling present. Decreased range of motion. Tenderness present.   Skin:     General: Skin is warm and dry.   Neurological:      Mental Status: She is alert and oriented to person, place, and time.      Sensory: Sensation is intact.      Motor: Motor function is intact.      Coordination: Coordination is intact.      Deep Tendon Reflexes:      Reflex Scores:       Tricep reflexes are 1+ on the right side and 1+ on the left side.       Bicep reflexes are 1+ on the right side and 1+ on the left side.       Brachioradialis reflexes are 1+ on the right side and 1+ on the left side.       Patellar reflexes are 0 on the right side and 0 on the left side.       Achilles reflexes are 2+ on the right side and 2+ on the left side.  Psychiatric:         Behavior: Behavior normal.         Assessment:       Problem List Items Addressed This Visit    None     Visit Diagnoses     Hypertension, unspecified type    -  Primary    Relevant Orders    CBC Auto Differential    Comprehensive Metabolic Panel    Screening for diabetes mellitus        Relevant Orders    Hemoglobin A1C    Screening for lipoid disorders        Relevant Orders    Lipid Panel    Fatigue, unspecified type        Relevant Orders    TSH    Chronic midline thoracic back pain        Relevant Orders    X-Ray  Thoracic Spine AP Lateral (Completed)          Plan:        1. Refill lisinopril for hypertension - RTC for fasting labs in the next one week.   2. Schedule colonoscopy and mammogram.   3. Thoracic xray, complains of chronic pain.   4. Schedule follow up in 1-2 weeks to review labs and xray.

## 2022-02-28 LAB
ALBUMIN SERPL BCP-MCNC: 3.4 G/DL (ref 3.5–5)
ALBUMIN/GLOB SERPL: 0.9 {RATIO}
ALP SERPL-CCNC: 103 U/L (ref 41–108)
ALT SERPL W P-5'-P-CCNC: 26 U/L (ref 13–56)
ANION GAP SERPL CALCULATED.3IONS-SCNC: 9 MMOL/L (ref 7–16)
AST SERPL W P-5'-P-CCNC: 15 U/L (ref 15–37)
BASOPHILS # BLD AUTO: 0.08 K/UL (ref 0–0.2)
BASOPHILS NFR BLD AUTO: 1.3 % (ref 0–1)
BILIRUB SERPL-MCNC: 0.5 MG/DL (ref 0–1.2)
BUN SERPL-MCNC: 10 MG/DL (ref 7–18)
BUN/CREAT SERPL: 12 (ref 6–20)
CALCIUM SERPL-MCNC: 8.8 MG/DL (ref 8.5–10.1)
CHLORIDE SERPL-SCNC: 110 MMOL/L (ref 98–107)
CHOLEST SERPL-MCNC: 227 MG/DL (ref 0–200)
CHOLEST/HDLC SERPL: 6 {RATIO}
CO2 SERPL-SCNC: 27 MMOL/L (ref 21–32)
CREAT SERPL-MCNC: 0.83 MG/DL (ref 0.55–1.02)
DIFFERENTIAL METHOD BLD: ABNORMAL
EOSINOPHIL # BLD AUTO: 0.25 K/UL (ref 0–0.5)
EOSINOPHIL NFR BLD AUTO: 4.2 % (ref 1–4)
ERYTHROCYTE [DISTWIDTH] IN BLOOD BY AUTOMATED COUNT: 14.9 % (ref 11.5–14.5)
GLOBULIN SER-MCNC: 4 G/DL (ref 2–4)
GLUCOSE SERPL-MCNC: 98 MG/DL (ref 74–106)
HCT VFR BLD AUTO: 36.4 % (ref 38–47)
HDLC SERPL-MCNC: 38 MG/DL (ref 40–60)
HGB BLD-MCNC: 11.5 G/DL (ref 12–16)
IMM GRANULOCYTES # BLD AUTO: 0.02 K/UL (ref 0–0.04)
IMM GRANULOCYTES NFR BLD: 0.3 % (ref 0–0.4)
LDLC SERPL CALC-MCNC: 158 MG/DL
LDLC/HDLC SERPL: 4.2 {RATIO}
LYMPHOCYTES # BLD AUTO: 2.04 K/UL (ref 1–4.8)
LYMPHOCYTES NFR BLD AUTO: 33.9 % (ref 27–41)
MCH RBC QN AUTO: 25.4 PG (ref 27–31)
MCHC RBC AUTO-ENTMCNC: 31.6 G/DL (ref 32–36)
MCV RBC AUTO: 80.4 FL (ref 80–96)
MONOCYTES # BLD AUTO: 0.46 K/UL (ref 0–0.8)
MONOCYTES NFR BLD AUTO: 7.7 % (ref 2–6)
MPC BLD CALC-MCNC: 11.7 FL (ref 9.4–12.4)
NEUTROPHILS # BLD AUTO: 3.16 K/UL (ref 1.8–7.7)
NEUTROPHILS NFR BLD AUTO: 52.6 % (ref 53–65)
NONHDLC SERPL-MCNC: 189 MG/DL
NRBC # BLD AUTO: 0 X10E3/UL
NRBC, AUTO (.00): 0 %
PLATELET # BLD AUTO: 307 K/UL (ref 150–400)
POTASSIUM SERPL-SCNC: 4.1 MMOL/L (ref 3.5–5.1)
PROT SERPL-MCNC: 7.4 G/DL (ref 6.4–8.2)
RBC # BLD AUTO: 4.53 M/UL (ref 4.2–5.4)
SODIUM SERPL-SCNC: 142 MMOL/L (ref 136–145)
TRIGL SERPL-MCNC: 156 MG/DL (ref 35–150)
TSH SERPL DL<=0.005 MIU/L-ACNC: 5.26 UIU/ML (ref 0.36–3.74)
VLDLC SERPL-MCNC: 31 MG/DL
WBC # BLD AUTO: 6.01 K/UL (ref 4.5–11)

## 2022-02-28 PROCEDURE — 80061 LIPID PANEL: ICD-10-PCS | Mod: ,,, | Performed by: CLINICAL MEDICAL LABORATORY

## 2022-02-28 PROCEDURE — 84443 TSH: ICD-10-PCS | Mod: ,,, | Performed by: CLINICAL MEDICAL LABORATORY

## 2022-02-28 PROCEDURE — 83036 HEMOGLOBIN A1C: ICD-10-PCS | Mod: GZ,,, | Performed by: CLINICAL MEDICAL LABORATORY

## 2022-02-28 PROCEDURE — 80053 COMPREHEN METABOLIC PANEL: CPT | Mod: ,,, | Performed by: CLINICAL MEDICAL LABORATORY

## 2022-02-28 PROCEDURE — 84443 ASSAY THYROID STIM HORMONE: CPT | Mod: ,,, | Performed by: CLINICAL MEDICAL LABORATORY

## 2022-02-28 PROCEDURE — 83036 HEMOGLOBIN GLYCOSYLATED A1C: CPT | Mod: GZ,,, | Performed by: CLINICAL MEDICAL LABORATORY

## 2022-02-28 PROCEDURE — 85025 COMPLETE CBC W/AUTO DIFF WBC: CPT | Mod: ,,, | Performed by: CLINICAL MEDICAL LABORATORY

## 2022-02-28 PROCEDURE — 85025 CBC WITH DIFFERENTIAL: ICD-10-PCS | Mod: ,,, | Performed by: CLINICAL MEDICAL LABORATORY

## 2022-02-28 PROCEDURE — 80061 LIPID PANEL: CPT | Mod: ,,, | Performed by: CLINICAL MEDICAL LABORATORY

## 2022-02-28 PROCEDURE — 80053 COMPREHENSIVE METABOLIC PANEL: ICD-10-PCS | Mod: ,,, | Performed by: CLINICAL MEDICAL LABORATORY

## 2022-03-01 LAB
EST. AVERAGE GLUCOSE BLD GHB EST-MCNC: 120 MG/DL
HBA1C MFR BLD HPLC: 6.2 % (ref 4.5–6.6)

## 2022-03-04 ENCOUNTER — HOSPITAL ENCOUNTER (OUTPATIENT)
Dept: RADIOLOGY | Facility: HOSPITAL | Age: 52
Discharge: HOME | End: 2022-03-04
Attending: NURSE PRACTITIONER
Payer: COMMERCIAL

## 2022-03-04 DIAGNOSIS — Z12.39 ENCOUNTER FOR SCREENING FOR MALIGNANT NEOPLASM OF BREAST, UNSPECIFIED SCREENING MODALITY: ICD-10-CM

## 2022-03-04 PROCEDURE — 77067 SCR MAMMO BI INCL CAD: CPT | Mod: TC

## 2022-03-23 ENCOUNTER — OFFICE VISIT (OUTPATIENT)
Dept: FAMILY MEDICINE | Facility: CLINIC | Age: 52
End: 2022-03-23
Payer: COMMERCIAL

## 2022-03-23 VITALS
DIASTOLIC BLOOD PRESSURE: 104 MMHG | HEIGHT: 66 IN | RESPIRATION RATE: 18 BRPM | TEMPERATURE: 99 F | BODY MASS INDEX: 38.41 KG/M2 | SYSTOLIC BLOOD PRESSURE: 150 MMHG | OXYGEN SATURATION: 96 % | HEART RATE: 69 BPM | WEIGHT: 239 LBS

## 2022-03-23 DIAGNOSIS — G89.29 CHRONIC MIDLINE LOW BACK PAIN WITHOUT SCIATICA: Primary | ICD-10-CM

## 2022-03-23 DIAGNOSIS — M17.10 ARTHRITIS OF KNEE: ICD-10-CM

## 2022-03-23 DIAGNOSIS — I10 HYPERTENSION, UNSPECIFIED TYPE: ICD-10-CM

## 2022-03-23 DIAGNOSIS — E78.5 DYSLIPIDEMIA: ICD-10-CM

## 2022-03-23 DIAGNOSIS — G89.29 CHRONIC MIDLINE THORACIC BACK PAIN: ICD-10-CM

## 2022-03-23 DIAGNOSIS — M54.6 CHRONIC MIDLINE THORACIC BACK PAIN: ICD-10-CM

## 2022-03-23 DIAGNOSIS — M54.50 CHRONIC MIDLINE LOW BACK PAIN WITHOUT SCIATICA: Primary | ICD-10-CM

## 2022-03-23 PROCEDURE — 3077F SYST BP >= 140 MM HG: CPT | Mod: CPTII,,, | Performed by: NURSE PRACTITIONER

## 2022-03-23 PROCEDURE — 3044F PR MOST RECENT HEMOGLOBIN A1C LEVEL <7.0%: ICD-10-PCS | Mod: CPTII,,, | Performed by: NURSE PRACTITIONER

## 2022-03-23 PROCEDURE — 3008F PR BODY MASS INDEX (BMI) DOCUMENTED: ICD-10-PCS | Mod: CPTII,,, | Performed by: NURSE PRACTITIONER

## 2022-03-23 PROCEDURE — 4010F PR ACE/ARB THEARPY RXD/TAKEN: ICD-10-PCS | Mod: CPTII,,, | Performed by: NURSE PRACTITIONER

## 2022-03-23 PROCEDURE — 3008F BODY MASS INDEX DOCD: CPT | Mod: CPTII,,, | Performed by: NURSE PRACTITIONER

## 2022-03-23 PROCEDURE — 1159F MED LIST DOCD IN RCRD: CPT | Mod: CPTII,,, | Performed by: NURSE PRACTITIONER

## 2022-03-23 PROCEDURE — 1159F PR MEDICATION LIST DOCUMENTED IN MEDICAL RECORD: ICD-10-PCS | Mod: CPTII,,, | Performed by: NURSE PRACTITIONER

## 2022-03-23 PROCEDURE — 1160F PR REVIEW ALL MEDS BY PRESCRIBER/CLIN PHARMACIST DOCUMENTED: ICD-10-PCS | Mod: CPTII,,, | Performed by: NURSE PRACTITIONER

## 2022-03-23 PROCEDURE — 3077F PR MOST RECENT SYSTOLIC BLOOD PRESSURE >= 140 MM HG: ICD-10-PCS | Mod: CPTII,,, | Performed by: NURSE PRACTITIONER

## 2022-03-23 PROCEDURE — 1160F RVW MEDS BY RX/DR IN RCRD: CPT | Mod: CPTII,,, | Performed by: NURSE PRACTITIONER

## 2022-03-23 PROCEDURE — 99213 PR OFFICE/OUTPT VISIT, EST, LEVL III, 20-29 MIN: ICD-10-PCS | Mod: ,,, | Performed by: NURSE PRACTITIONER

## 2022-03-23 PROCEDURE — 3080F PR MOST RECENT DIASTOLIC BLOOD PRESSURE >= 90 MM HG: ICD-10-PCS | Mod: CPTII,,, | Performed by: NURSE PRACTITIONER

## 2022-03-23 PROCEDURE — 3080F DIAST BP >= 90 MM HG: CPT | Mod: CPTII,,, | Performed by: NURSE PRACTITIONER

## 2022-03-23 PROCEDURE — 3044F HG A1C LEVEL LT 7.0%: CPT | Mod: CPTII,,, | Performed by: NURSE PRACTITIONER

## 2022-03-23 PROCEDURE — 99213 OFFICE O/P EST LOW 20 MIN: CPT | Mod: ,,, | Performed by: NURSE PRACTITIONER

## 2022-03-23 PROCEDURE — 4010F ACE/ARB THERAPY RXD/TAKEN: CPT | Mod: CPTII,,, | Performed by: NURSE PRACTITIONER

## 2022-03-23 RX ORDER — ROSUVASTATIN CALCIUM 10 MG/1
10 TABLET, COATED ORAL DAILY
Qty: 90 TABLET | Refills: 0 | Status: SHIPPED | OUTPATIENT
Start: 2022-03-23 | End: 2022-06-09

## 2022-03-23 NOTE — PROGRESS NOTES
"  Billing Provider: CAROL ANN Andrews  Level of Service: [place] OFFICE/OUTPT VISIT, BRIAN CAMPBELL III, 20-29 MIN  Patient PCP Information     Provider PCP Type    CAROL ANN Andrews General          Reason for Visit / Chief Complaint: Follow-up (Cholesterol/thyroid) and Tingling Sensation (Right thigh, denies pain)       Update PCP  Update Chief Complaint         History of Present Illness / Problem Focused Workflow     Samara Liz presents to the clinic with Follow-up (Cholesterol/thyroid) and Tingling Sensation (Right thigh, denies pain)     Ms. Liz presents today to discuss labs. She states she has been told in the past she had high cholesterol, but has not taken any lipid lowering agents. She reports having occasional headaches when she forgets to take her BP medication. She states she has not taken her BP medication today. She also reports having "tingling" in her right thigh. She states this began after having her right knee scoped a few months ago. She states this has not limited her ambulation.       Review of Systems     Review of Systems   Constitutional: Negative for fatigue and fever.   HENT: Negative for ear pain, rhinorrhea, sinus pressure/congestion, sneezing and trouble swallowing.    Eyes: Negative for pain and eye dryness.   Respiratory: Negative for apnea, cough, chest tightness, shortness of breath and wheezing.    Cardiovascular: Negative for chest pain, palpitations and leg swelling.   Gastrointestinal: Negative for abdominal pain, blood in stool, diarrhea, nausea and vomiting.   Endocrine: Negative for cold intolerance and heat intolerance.   Genitourinary: Negative for dysuria and hematuria.   Musculoskeletal: Negative for back pain and neck pain.   Integumentary:  Negative for rash and wound.   Neurological: Positive for headaches. Negative for dizziness, seizures and syncope.   Hematological: Negative for adenopathy.   Psychiatric/Behavioral: Negative for confusion. The patient is " not nervous/anxious.         Medical / Social / Family History     Past Medical History:   Diagnosis Date    Arthritis     Hypertension        Past Surgical History:   Procedure Laterality Date    ARTHROSCOPY OF KNEE Left 01/04/2022    Procedure: ARTHROSCOPY, KNEE;  Surgeon: Joseph Gonzalez MD;  Location: [facility];  Service: Orthopedics;  Laterality: Left;    EXCISION OF MEDIAL MENISCUS OF KNEE Left 01/04/2022    Procedure: MENISCECTOMY, KNEE, MEDIAL LATERAL;  Surgeon: Joseph Gonzalez MD;  Location: [facility];  Service: Orthopedics;  Laterality: Left;    HYSTERECTOMY      knee scope         Social History  Ms.  reports that she has never smoked. She has never used smokeless tobacco. She reports that she does not drink alcohol and does not use drugs.    Family History  Ms.'s family history includes Breast cancer in her sister; Diabetes in her mother and sister; Hypertension in her sister.    Medications and Allergies     Medications  Outpatient Medications Marked as Taking for the 3/23/22 encounter (Office Visit) with CAROL ANN Andrews   Medication Sig Dispense Refill    HYDROcodone-acetaminophen (NORCO)  mg per tablet Take 1 tablet by mouth every 6 (six) hours as needed for Pain. 28 tablet 0    lisinopriL 10 MG tablet Take 1 tablet (10 mg total) by mouth once daily. 90 tablet 0       Allergies  Review of patient's allergies indicates:  No Known Allergies    Physical Examination     Vitals:    03/23/22 1555   BP: (!) 150/104   Pulse:    Resp:    Temp:      Physical Exam  Constitutional:       Appearance: Normal appearance. She is obese.   HENT:      Head: Normocephalic and atraumatic.      Right Ear: External ear normal.      Left Ear: External ear normal.      Nose: Nose normal.      Mouth/Throat:      Mouth: Mucous membranes are moist.   Eyes:      Pupils: Pupils are equal, round, and reactive to light.   Cardiovascular:      Rate and Rhythm: Normal rate and regular rhythm.       Pulses: Normal pulses.      Heart sounds: Normal heart sounds.   Pulmonary:      Effort: Pulmonary effort is normal.      Breath sounds: Normal breath sounds.   Abdominal:      General: Bowel sounds are normal.      Palpations: Abdomen is soft.   Musculoskeletal:         General: No signs of injury. Normal range of motion.      Cervical back: Normal range of motion and neck supple.      Right lower leg: No edema.      Left lower leg: No edema.   Skin:     General: Skin is warm and dry.      Capillary Refill: Capillary refill takes less than 2 seconds.   Neurological:      General: No focal deficit present.      Mental Status: She is alert and oriented to person, place, and time.   Psychiatric:         Mood and Affect: Mood normal.         Behavior: Behavior normal.         Thought Content: Thought content normal.          Assessment and Plan (including Health Maintenance)      Problem List  Smart Sets  Document Outside HM   :    Plan:   Chronic midline low back pain without sciatica  -     Ambulatory referral/consult to Physical/Occupational Therapy; Future; Expected date: 03/30/2022    Arthritis of knee  -     Ambulatory referral/consult to Physical/Occupational Therapy; Future; Expected date: 03/30/2022    Hypertension, unspecified type    Chronic midline thoracic back pain    Dyslipidemia    Other orders  -     rosuvastatin (CRESTOR) 10 MG tablet; Take 1 tablet (10 mg total) by mouth once daily.  Dispense: 90 tablet; Refill: 0           Health Maintenance Due   Topic Date Due    Hepatitis C Screening  Never done    COVID-19 Vaccine (1) Never done    HIV Screening  Never done    TETANUS VACCINE  Never done    Colorectal Cancer Screening  Never done    Shingles Vaccine (1 of 2) Never done    Influenza Vaccine (1) Never done       Problem List Items Addressed This Visit    None     Visit Diagnoses     Chronic midline low back pain without sciatica    -  Primary    Relevant Orders    Ambulatory  referral/consult to Physical/Occupational Therapy    Arthritis of knee        Relevant Orders    Ambulatory referral/consult to Physical/Occupational Therapy    Hypertension, unspecified type        Chronic midline thoracic back pain        Dyslipidemia              Health Maintenance Topics with due status: Not Due       Topic Last Completion Date    Lipid Panel 02/28/2022    Mammogram 03/04/2022       Future Appointments   Date Time Provider Department Center   6/23/2022  3:15 PM CAROLA NN Andrews [facility] [place] [facility]   3/10/2023 10:15 AM [facility]        There are no Patient Instructions on file for this visit.  No follow-ups on file.     Signature:  CAROL ANN Andrews      Date of encounter: 3/23/22

## 2022-04-25 ENCOUNTER — HOSPITAL ENCOUNTER (EMERGENCY)
Facility: HOSPITAL | Age: 52
Discharge: HOME | End: 2022-04-25
Payer: COMMERCIAL

## 2022-04-25 ENCOUNTER — OFFICE VISIT (OUTPATIENT)
Dept: FAMILY MEDICINE | Facility: CLINIC | Age: 52
End: 2022-04-25
Payer: COMMERCIAL

## 2022-04-25 VITALS
BODY MASS INDEX: 38.89 KG/M2 | TEMPERATURE: 98 F | RESPIRATION RATE: 19 BRPM | BODY MASS INDEX: 38.57 KG/M2 | OXYGEN SATURATION: 99 % | HEIGHT: 66 IN | RESPIRATION RATE: 18 BRPM | HEIGHT: 66 IN | DIASTOLIC BLOOD PRESSURE: 130 MMHG | WEIGHT: 240 LBS | HEART RATE: 80 BPM | SYSTOLIC BLOOD PRESSURE: 220 MMHG | WEIGHT: 242 LBS | DIASTOLIC BLOOD PRESSURE: 113 MMHG | HEART RATE: 78 BPM | TEMPERATURE: 98 F | OXYGEN SATURATION: 97 % | SYSTOLIC BLOOD PRESSURE: 180 MMHG

## 2022-04-25 DIAGNOSIS — R03.0 ELEVATED BLOOD PRESSURE READING: ICD-10-CM

## 2022-04-25 DIAGNOSIS — S83.242A ACUTE MEDIAL MENISCUS TEAR OF LEFT KNEE: ICD-10-CM

## 2022-04-25 DIAGNOSIS — M54.9 BACK PAIN, UNSPECIFIED BACK LOCATION, UNSPECIFIED BACK PAIN LATERALITY, UNSPECIFIED CHRONICITY: Primary | ICD-10-CM

## 2022-04-25 DIAGNOSIS — I16.0 HYPERTENSIVE URGENCY: ICD-10-CM

## 2022-04-25 DIAGNOSIS — I16.0 HYPERTENSIVE URGENCY: Primary | ICD-10-CM

## 2022-04-25 DIAGNOSIS — I10 HYPERTENSION, UNSPECIFIED TYPE: ICD-10-CM

## 2022-04-25 LAB
ANION GAP SERPL CALCULATED.3IONS-SCNC: 15 MMOL/L (ref 7–16)
BASOPHILS # BLD AUTO: 0.06 K/UL (ref 0–0.2)
BASOPHILS NFR BLD AUTO: 0.9 % (ref 0–1)
BUN SERPL-MCNC: 11 MG/DL (ref 7–18)
BUN/CREAT SERPL: 12 (ref 6–20)
CALCIUM SERPL-MCNC: 8.6 MG/DL (ref 8.5–10.1)
CHLORIDE SERPL-SCNC: 108 MMOL/L (ref 98–107)
CO2 SERPL-SCNC: 25 MMOL/L (ref 21–32)
CREAT SERPL-MCNC: 0.91 MG/DL (ref 0.55–1.02)
DIFFERENTIAL METHOD BLD: ABNORMAL
EOSINOPHIL # BLD AUTO: 0.31 K/UL (ref 0–0.5)
EOSINOPHIL NFR BLD AUTO: 4.5 % (ref 1–4)
ERYTHROCYTE [DISTWIDTH] IN BLOOD BY AUTOMATED COUNT: 15.2 % (ref 11.5–14.5)
GLUCOSE SERPL-MCNC: 100 MG/DL (ref 74–106)
HCT VFR BLD AUTO: 35 % (ref 38–47)
HGB BLD-MCNC: 10.9 G/DL (ref 12–16)
IMM GRANULOCYTES # BLD AUTO: 0.02 K/UL (ref 0–0.04)
IMM GRANULOCYTES NFR BLD: 0.3 % (ref 0–0.4)
LYMPHOCYTES # BLD AUTO: 1.97 K/UL (ref 1–4.8)
LYMPHOCYTES NFR BLD AUTO: 28.4 % (ref 27–41)
MCH RBC QN AUTO: 25.4 PG (ref 27–31)
MCHC RBC AUTO-ENTMCNC: 31.1 G/DL (ref 32–36)
MCV RBC AUTO: 81.6 FL (ref 80–96)
MONOCYTES # BLD AUTO: 0.56 K/UL (ref 0–0.8)
MONOCYTES NFR BLD AUTO: 8.1 % (ref 2–6)
MPC BLD CALC-MCNC: 11.3 FL (ref 9.4–12.4)
NEUTROPHILS # BLD AUTO: 4.01 K/UL (ref 1.8–7.7)
NEUTROPHILS NFR BLD AUTO: 57.8 % (ref 53–65)
NRBC # BLD AUTO: 0 X10E3/UL
NRBC, AUTO (.00): 0 %
PLATELET # BLD AUTO: 275 K/UL (ref 150–400)
POTASSIUM SERPL-SCNC: 3.9 MMOL/L (ref 3.5–5.1)
RBC # BLD AUTO: 4.29 M/UL (ref 4.2–5.4)
SODIUM SERPL-SCNC: 144 MMOL/L (ref 136–145)
WBC # BLD AUTO: 6.93 K/UL (ref 4.5–11)

## 2022-04-25 PROCEDURE — 85025 COMPLETE CBC W/AUTO DIFF WBC: CPT | Performed by: NURSE PRACTITIONER

## 2022-04-25 PROCEDURE — 99499 NO LOS: ICD-10-PCS | Mod: ,,, | Performed by: NURSE PRACTITIONER

## 2022-04-25 PROCEDURE — 80048 BASIC METABOLIC PNL TOTAL CA: CPT | Performed by: NURSE PRACTITIONER

## 2022-04-25 PROCEDURE — 3044F PR MOST RECENT HEMOGLOBIN A1C LEVEL <7.0%: ICD-10-PCS | Mod: CPTII,,, | Performed by: NURSE PRACTITIONER

## 2022-04-25 PROCEDURE — 3080F PR MOST RECENT DIASTOLIC BLOOD PRESSURE >= 90 MM HG: ICD-10-PCS | Mod: CPTII,,, | Performed by: NURSE PRACTITIONER

## 2022-04-25 PROCEDURE — 99283 EMERGENCY DEPT VISIT LOW MDM: CPT | Mod: ,,, | Performed by: NURSE PRACTITIONER

## 2022-04-25 PROCEDURE — 4010F PR ACE/ARB THEARPY RXD/TAKEN: ICD-10-PCS | Mod: CPTII,,, | Performed by: NURSE PRACTITIONER

## 2022-04-25 PROCEDURE — 96375 TX/PRO/DX INJ NEW DRUG ADDON: CPT

## 2022-04-25 PROCEDURE — 99283 PR EMERGENCY DEPT VISIT,LEVEL III: ICD-10-PCS | Mod: ,,, | Performed by: NURSE PRACTITIONER

## 2022-04-25 PROCEDURE — 1159F MED LIST DOCD IN RCRD: CPT | Mod: CPTII,,, | Performed by: NURSE PRACTITIONER

## 2022-04-25 PROCEDURE — 3008F PR BODY MASS INDEX (BMI) DOCUMENTED: ICD-10-PCS | Mod: CPTII,,, | Performed by: NURSE PRACTITIONER

## 2022-04-25 PROCEDURE — 3077F SYST BP >= 140 MM HG: CPT | Mod: CPTII,,, | Performed by: NURSE PRACTITIONER

## 2022-04-25 PROCEDURE — 3080F DIAST BP >= 90 MM HG: CPT | Mod: CPTII,,, | Performed by: NURSE PRACTITIONER

## 2022-04-25 PROCEDURE — 3077F PR MOST RECENT SYSTOLIC BLOOD PRESSURE >= 140 MM HG: ICD-10-PCS | Mod: CPTII,,, | Performed by: NURSE PRACTITIONER

## 2022-04-25 PROCEDURE — 3008F BODY MASS INDEX DOCD: CPT | Mod: CPTII,,, | Performed by: NURSE PRACTITIONER

## 2022-04-25 PROCEDURE — 99499 UNLISTED E&M SERVICE: CPT | Mod: ,,, | Performed by: NURSE PRACTITIONER

## 2022-04-25 PROCEDURE — 96374 THER/PROPH/DIAG INJ IV PUSH: CPT

## 2022-04-25 PROCEDURE — 3044F HG A1C LEVEL LT 7.0%: CPT | Mod: CPTII,,, | Performed by: NURSE PRACTITIONER

## 2022-04-25 PROCEDURE — 99284 EMERGENCY DEPT VISIT MOD MDM: CPT | Mod: 25

## 2022-04-25 PROCEDURE — 1159F PR MEDICATION LIST DOCUMENTED IN MEDICAL RECORD: ICD-10-PCS | Mod: CPTII,,, | Performed by: NURSE PRACTITIONER

## 2022-04-25 PROCEDURE — 36415 COLL VENOUS BLD VENIPUNCTURE: CPT | Performed by: NURSE PRACTITIONER

## 2022-04-25 PROCEDURE — 63600175 PHARM REV CODE 636 W HCPCS: Performed by: NURSE PRACTITIONER

## 2022-04-25 PROCEDURE — 4010F ACE/ARB THERAPY RXD/TAKEN: CPT | Mod: CPTII,,, | Performed by: NURSE PRACTITIONER

## 2022-04-25 RX ORDER — ORPHENADRINE CITRATE 30 MG/ML
60 INJECTION INTRAMUSCULAR; INTRAVENOUS
Status: COMPLETED | OUTPATIENT
Start: 2022-04-25 | End: 2022-04-25

## 2022-04-25 RX ORDER — IBUPROFEN 800 MG/1
800 TABLET ORAL 3 TIMES DAILY
Qty: 15 TABLET | Refills: 0 | Status: SHIPPED | OUTPATIENT
Start: 2022-04-25 | End: 2022-04-30

## 2022-04-25 RX ORDER — METHOCARBAMOL 500 MG/1
1000 TABLET, FILM COATED ORAL 3 TIMES DAILY
Qty: 30 TABLET | Refills: 0 | Status: SHIPPED | OUTPATIENT
Start: 2022-04-25 | End: 2022-04-30

## 2022-04-25 RX ORDER — KETOROLAC TROMETHAMINE 15 MG/ML
30 INJECTION, SOLUTION INTRAMUSCULAR; INTRAVENOUS
Status: COMPLETED | OUTPATIENT
Start: 2022-04-25 | End: 2022-04-25

## 2022-04-25 RX ADMIN — KETOROLAC TROMETHAMINE 30 MG: 15 INJECTION, SOLUTION INTRAMUSCULAR; INTRAVENOUS at 03:04

## 2022-04-25 RX ADMIN — ORPHENADRINE CITRATE 60 MG: 60 INJECTION INTRAMUSCULAR; INTRAVENOUS at 03:04

## 2022-04-25 NOTE — PROGRESS NOTES
Subjective:       Patient ID: Samara Liz is a 51 y.o. female.    Chief Complaint: Back Pain (Pain in the middle of back x 1 week. )    Mid back pain- states she has tried multiple medications with no relief in symptoms- BP is 220/130 manually and pt states she has taken her BP medication- pt sent to the ER for further evaluation and treatment- report called to [facility]    Back Pain      Review of Systems   Musculoskeletal: Positive for back pain.         Objective:      Physical Exam       Assessment:       No diagnosis found.    Plan:   There are no diagnoses linked to this encounter.

## 2022-04-25 NOTE — ED PROVIDER NOTES
Encounter Date: 4/25/2022       History     Chief Complaint   Patient presents with    Back Pain     51 year old female presents to the emergency department to be evaluated for back pain and elevated blood pressure. She [facility] to [facility] earlier today to be evaluated for back pain. She said that she has had back pain similar to this for about a year, but it has been worse for the past week. She has a history of hypertension and took her blood pressure medication three hours ago. Denies headache, chest pain, shortness of breath. She was sent from immediate care to be evaluated because her blood pressure was 220/130.    The history is provided by the patient.   Back Pain   This is a new problem. The current episode started several days ago. Pertinent negatives include no chest pain, no fever, no numbness, no weight loss, no headaches, no abdominal pain, no abdominal swelling, no bowel incontinence, no perianal numbness, no bladder incontinence, no dysuria, no pelvic pain, no leg pain, no paresthesias, no paresis, no tingling and no weakness.     Review of patient's allergies indicates:  No Known Allergies  Past Medical History:   Diagnosis Date    Arthritis     Hypertension      Past Surgical History:   Procedure Laterality Date    ARTHROSCOPY OF KNEE Left 01/04/2022    Procedure: ARTHROSCOPY, KNEE;  Surgeon: Joseph Gonzalez MD;  Location: [facility] OR;  Service: Orthopedics;  Laterality: Left;    EXCISION OF MEDIAL MENISCUS OF KNEE Left 01/04/2022    Procedure: MENISCECTOMY, KNEE, MEDIAL LATERAL;  Surgeon: Joseph Gonzalez MD;  Location: [facility] OR;  Service: Orthopedics;  Laterality: Left;    HYSTERECTOMY      knee scope       Family History   Problem Relation Age of Onset    Diabetes Mother     Breast cancer Sister     Diabetes Sister     Hypertension Sister      Social History     Tobacco Use    Smoking status: Never Smoker    Smokeless tobacco: Never Used    Substance Use Topics    Alcohol use: Never    Drug use: Never     Review of Systems   Constitutional: Negative for fever and weight loss.   Cardiovascular: Negative for chest pain.   Gastrointestinal: Negative for abdominal pain and bowel incontinence.   Genitourinary: Negative for bladder incontinence, dysuria and pelvic pain.   Musculoskeletal: Positive for back pain.   Neurological: Negative for tingling, weakness, numbness, headaches and paresthesias.   All other systems reviewed and are negative.      Physical Exam     Initial Vitals [04/25/22 1333]   BP Pulse Resp Temp SpO2   (S) (!) 180/113 80 19 98 °F (36.7 °C) 99 %      MAP       --         Physical Exam    Vitals reviewed.  Constitutional: She appears well-developed and well-nourished.   HENT:   Head: Normocephalic and atraumatic.   Eyes: Pupils are equal, round, and reactive to light.   Neck: Neck supple.   Cardiovascular: Normal rate and regular rhythm.   Pulmonary/Chest: Breath sounds normal.   Abdominal: Abdomen is soft. Bowel sounds are normal. She exhibits no distension and no mass. There is no abdominal tenderness. There is no rebound and no guarding.   Musculoskeletal:         General: Normal range of motion.      Cervical back: Neck supple.     Neurological: She is alert and oriented to person, place, and time. She has normal strength. No cranial nerve deficit or sensory deficit. GCS score is 15. GCS eye subscore is 4. GCS verbal subscore is 5. GCS motor subscore is 6.   Skin: Skin is warm and dry. Capillary refill takes less than 2 seconds.   Psychiatric: She has a normal mood and affect.         Medical Screening Exam   See Full Note    ED Course   Procedures  Labs Reviewed   BASIC METABOLIC PANEL - Abnormal; Notable for the following components:       Result Value    Chloride 108 (*)     All other components within normal limits   CBC WITH DIFFERENTIAL - Abnormal; Notable for the following components:    Hemoglobin 10.9 (*)     Hematocrit  35.0 (*)     MCH 25.4 (*)     MCHC 31.1 (*)     RDW 15.2 (*)     Monocytes % 8.1 (*)     Eosinophils % 4.5 (*)     All other components within normal limits   CBC W/ AUTO DIFFERENTIAL    Narrative:     The following orders were created for panel order CBC auto differential.  Procedure                               Abnormality         Status                     ---------                               -----------         ------                     CBC with Differential[[id number]]        Abnormal            Final result                 Please view results for these tests on the individual orders.          Imaging Results    None          Medications   orphenadrine injection 60 mg (has no administration in time range)   ketorolac injection 30 mg (has no administration in time range)                       Clinical Impression:   Final diagnoses:  [M54.9] Back pain, unspecified back location, unspecified back pain laterality, unspecified chronicity (Primary)  [R03.0] Elevated blood pressure reading  [I10] Hypertension, unspecified type          ED Disposition Condition    Discharge Stable        ED Prescriptions     Medication Sig Dispense Start Date End Date Auth. Provider    methocarbamoL (ROBAXIN) 500 MG Tab Take 2 tablets (1,000 mg total) by mouth 3 (three) times daily. for 5 days 30 tablet 4/25/2022 4/30/2022 CAROL ANN Welch    ibuprofen (ADVIL,MOTRIN) 800 MG tablet Take 1 tablet (800 mg total) by mouth 3 (three) times daily. for 5 days 15 tablet 4/25/2022 4/30/2022 CAROL ANN Welch        Follow-up Information    None          CAROL ANN Welch  04/25/22 1516

## 2022-04-25 NOTE — ED TRIAGE NOTES
PRESENTS TO ER WITH COMPLAINT OF BACK PAIN. WAS SEEN AT [facility] AND SENT HERE DUE TO ELEVATED BP

## 2022-06-09 ENCOUNTER — OFFICE VISIT (OUTPATIENT)
Dept: FAMILY MEDICINE | Facility: CLINIC | Age: 52
End: 2022-06-09
Payer: COMMERCIAL

## 2022-06-09 VITALS
BODY MASS INDEX: 38.57 KG/M2 | WEIGHT: 240 LBS | SYSTOLIC BLOOD PRESSURE: 154 MMHG | RESPIRATION RATE: 18 BRPM | DIASTOLIC BLOOD PRESSURE: 100 MMHG | HEIGHT: 66 IN | HEART RATE: 88 BPM | TEMPERATURE: 98 F | OXYGEN SATURATION: 97 %

## 2022-06-09 DIAGNOSIS — E78.5 DYSLIPIDEMIA: ICD-10-CM

## 2022-06-09 DIAGNOSIS — I10 HYPERTENSION, UNSPECIFIED TYPE: Primary | ICD-10-CM

## 2022-06-09 DIAGNOSIS — Z12.11 ENCOUNTER FOR SCREENING FOR MALIGNANT NEOPLASM OF COLON: ICD-10-CM

## 2022-06-09 PROCEDURE — 3008F PR BODY MASS INDEX (BMI) DOCUMENTED: ICD-10-PCS | Mod: CPTII,,, | Performed by: NURSE PRACTITIONER

## 2022-06-09 PROCEDURE — 4010F ACE/ARB THERAPY RXD/TAKEN: CPT | Mod: CPTII,,, | Performed by: NURSE PRACTITIONER

## 2022-06-09 PROCEDURE — 3080F DIAST BP >= 90 MM HG: CPT | Mod: CPTII,,, | Performed by: NURSE PRACTITIONER

## 2022-06-09 PROCEDURE — 1160F PR REVIEW ALL MEDS BY PRESCRIBER/CLIN PHARMACIST DOCUMENTED: ICD-10-PCS | Mod: CPTII,,, | Performed by: NURSE PRACTITIONER

## 2022-06-09 PROCEDURE — 4010F PR ACE/ARB THEARPY RXD/TAKEN: ICD-10-PCS | Mod: CPTII,,, | Performed by: NURSE PRACTITIONER

## 2022-06-09 PROCEDURE — 99213 PR OFFICE/OUTPT VISIT, EST, LEVL III, 20-29 MIN: ICD-10-PCS | Mod: ,,, | Performed by: NURSE PRACTITIONER

## 2022-06-09 PROCEDURE — 3044F PR MOST RECENT HEMOGLOBIN A1C LEVEL <7.0%: ICD-10-PCS | Mod: CPTII,,, | Performed by: NURSE PRACTITIONER

## 2022-06-09 PROCEDURE — 1160F RVW MEDS BY RX/DR IN RCRD: CPT | Mod: CPTII,,, | Performed by: NURSE PRACTITIONER

## 2022-06-09 PROCEDURE — 1159F MED LIST DOCD IN RCRD: CPT | Mod: CPTII,,, | Performed by: NURSE PRACTITIONER

## 2022-06-09 PROCEDURE — 3080F PR MOST RECENT DIASTOLIC BLOOD PRESSURE >= 90 MM HG: ICD-10-PCS | Mod: CPTII,,, | Performed by: NURSE PRACTITIONER

## 2022-06-09 PROCEDURE — 3077F PR MOST RECENT SYSTOLIC BLOOD PRESSURE >= 140 MM HG: ICD-10-PCS | Mod: CPTII,,, | Performed by: NURSE PRACTITIONER

## 2022-06-09 PROCEDURE — 3008F BODY MASS INDEX DOCD: CPT | Mod: CPTII,,, | Performed by: NURSE PRACTITIONER

## 2022-06-09 PROCEDURE — 99213 OFFICE O/P EST LOW 20 MIN: CPT | Mod: ,,, | Performed by: NURSE PRACTITIONER

## 2022-06-09 PROCEDURE — 1159F PR MEDICATION LIST DOCUMENTED IN MEDICAL RECORD: ICD-10-PCS | Mod: CPTII,,, | Performed by: NURSE PRACTITIONER

## 2022-06-09 PROCEDURE — 3044F HG A1C LEVEL LT 7.0%: CPT | Mod: CPTII,,, | Performed by: NURSE PRACTITIONER

## 2022-06-09 PROCEDURE — 3077F SYST BP >= 140 MM HG: CPT | Mod: CPTII,,, | Performed by: NURSE PRACTITIONER

## 2022-06-09 RX ORDER — LISINOPRIL 10 MG/1
10 TABLET ORAL DAILY
Qty: 90 TABLET | Refills: 0 | Status: CANCELLED | OUTPATIENT
Start: 2022-06-09

## 2022-06-09 RX ORDER — PRAVASTATIN SODIUM 20 MG/1
20 TABLET ORAL DAILY
Qty: 90 TABLET | Refills: 1 | Status: SHIPPED | OUTPATIENT
Start: 2022-06-09 | End: 2023-03-01

## 2022-06-09 RX ORDER — LISINOPRIL AND HYDROCHLOROTHIAZIDE 12.5; 2 MG/1; MG/1
1 TABLET ORAL DAILY
Qty: 90 TABLET | Refills: 3 | Status: SHIPPED | OUTPATIENT
Start: 2022-06-09 | End: 2023-03-16 | Stop reason: SDUPTHER

## 2022-06-09 NOTE — PROGRESS NOTES
Subjective:       Patient ID: Samara Liz is a 52 y.o. female.    Chief Complaint: Follow-up (3 month, htn, chol, nonfasting) and Medication Refill    Samara Liz is a 52 year old female with a past medical history of hypertension and hyperlipidemia here for follow-up and medication refill. She reports daily adherence and toleration to all medications except Crestor. She reports she missed one nightly dose so she took it in the morning and experienced severe nausea and dizziness at work that required her to go home for the day. Since then she has not taken Crestor. Additionally, she reports her home blood pressure readings have not been to goal although she has been taking the lisinopril daily. Additionally she reports occasional heart burn which she treats with over the counter Prilosec. She denies fever, chills, night sweats, chest pain, shortness of breath, vomiting or diarrhea.     Follow-up  Associated symptoms include nausea. Pertinent negatives include no chest pain, chills, coughing, fatigue or fever.   Medication Refill  Associated symptoms include nausea. Pertinent negatives include no chest pain, chills, coughing, fatigue or fever.     Review of Systems   Constitutional: Negative for chills, fatigue and fever.   Respiratory: Negative for cough and shortness of breath.    Cardiovascular: Negative for chest pain.   Gastrointestinal: Positive for nausea.   Neurological: Positive for dizziness.         Objective:      Physical Exam  Constitutional:       Appearance: Normal appearance. She is obese.   HENT:      Head: Normocephalic and atraumatic.      Nose: Nose normal.      Mouth/Throat:      Mouth: Mucous membranes are moist.      Pharynx: Oropharynx is clear. No oropharyngeal exudate or posterior oropharyngeal erythema.   Eyes:      Extraocular Movements: Extraocular movements intact.      Conjunctiva/sclera: Conjunctivae normal.      Pupils: Pupils are equal, round, and reactive to light.    Cardiovascular:      Rate and Rhythm: Normal rate and regular rhythm.      Heart sounds: Normal heart sounds. No murmur heard.    No friction rub. No gallop.   Pulmonary:      Effort: Pulmonary effort is normal.      Breath sounds: Normal breath sounds. No wheezing, rhonchi or rales.   Skin:     General: Skin is warm and dry.      Coloration: Skin is not jaundiced.   Neurological:      Mental Status: She is alert.   Psychiatric:         Mood and Affect: Mood normal.         Behavior: Behavior normal.         Thought Content: Thought content normal.         Judgment: Judgment normal.         Assessment:       Problem List Items Addressed This Visit    None     Visit Diagnoses     Hypertension, unspecified type    -  Primary    Encounter for screening for malignant neoplasm of colon        Relevant Orders    Ambulatory referral/consult to Gastroenterology    Dyslipidemia              Plan:       Discontinue lisinopril and Crestor.   Start Zestoretic 20-12.5 1 tablet PO daily  Start Pravachol 20 mg 1 tablet PO daily, consider Zetia.  Patient instructed to take statin nightly, and if dose is missed, wait to take the medication at night only.   Patient instructed to take blood pressure readings at home  Patient to follow up once she returns in August for fasting lipids and BMP

## 2022-10-27 ENCOUNTER — HOSPITAL ENCOUNTER (OUTPATIENT)
Dept: RADIOLOGY | Facility: HOSPITAL | Age: 52
Discharge: HOME | End: 2022-10-27
Attending: NURSE PRACTITIONER
Payer: COMMERCIAL

## 2022-10-27 ENCOUNTER — OFFICE VISIT (OUTPATIENT)
Dept: PRIMARY CARE CLINIC | Facility: CLINIC | Age: 52
End: 2022-10-27
Payer: OTHER MISCELLANEOUS

## 2022-10-27 ENCOUNTER — OFFICE VISIT (OUTPATIENT)
Dept: FAMILY MEDICINE | Facility: CLINIC | Age: 52
End: 2022-10-27
Payer: COMMERCIAL

## 2022-10-27 VITALS
HEIGHT: 66 IN | HEART RATE: 74 BPM | WEIGHT: 240 LBS | DIASTOLIC BLOOD PRESSURE: 76 MMHG | TEMPERATURE: 98 F | OXYGEN SATURATION: 97 % | RESPIRATION RATE: 18 BRPM | SYSTOLIC BLOOD PRESSURE: 149 MMHG | BODY MASS INDEX: 38.57 KG/M2

## 2022-10-27 VITALS
RESPIRATION RATE: 18 BRPM | BODY MASS INDEX: 37.93 KG/M2 | OXYGEN SATURATION: 96 % | WEIGHT: 236 LBS | SYSTOLIC BLOOD PRESSURE: 136 MMHG | HEART RATE: 89 BPM | TEMPERATURE: 98 F | HEIGHT: 66 IN | DIASTOLIC BLOOD PRESSURE: 88 MMHG

## 2022-10-27 DIAGNOSIS — S80.11XA CONTUSION OF RIGHT LEG, INITIAL ENCOUNTER: ICD-10-CM

## 2022-10-27 DIAGNOSIS — N39.3 STRESS INCONTINENCE: ICD-10-CM

## 2022-10-27 DIAGNOSIS — K21.9 GASTROESOPHAGEAL REFLUX DISEASE, UNSPECIFIED WHETHER ESOPHAGITIS PRESENT: ICD-10-CM

## 2022-10-27 DIAGNOSIS — I10 HYPERTENSION, UNSPECIFIED TYPE: ICD-10-CM

## 2022-10-27 DIAGNOSIS — E03.9 HYPOTHYROIDISM, UNSPECIFIED TYPE: ICD-10-CM

## 2022-10-27 DIAGNOSIS — S80.11XA CONTUSION OF RIGHT LEG, INITIAL ENCOUNTER: Primary | ICD-10-CM

## 2022-10-27 DIAGNOSIS — R73.03 PREDIABETES: ICD-10-CM

## 2022-10-27 DIAGNOSIS — E78.5 DYSLIPIDEMIA: ICD-10-CM

## 2022-10-27 DIAGNOSIS — J32.9 SINUSITIS, UNSPECIFIED CHRONICITY, UNSPECIFIED LOCATION: Primary | ICD-10-CM

## 2022-10-27 DIAGNOSIS — R25.2 LEG CRAMPING: ICD-10-CM

## 2022-10-27 PROCEDURE — 73590 X-RAY EXAM OF LOWER LEG: CPT | Mod: 26,RT,, | Performed by: RADIOLOGY

## 2022-10-27 PROCEDURE — 73590 X-RAY EXAM OF LOWER LEG: CPT | Mod: TC,RT

## 2022-10-27 PROCEDURE — 1159F PR MEDICATION LIST DOCUMENTED IN MEDICAL RECORD: ICD-10-PCS | Mod: CPTII,,, | Performed by: NURSE PRACTITIONER

## 2022-10-27 PROCEDURE — 3044F PR MOST RECENT HEMOGLOBIN A1C LEVEL <7.0%: ICD-10-PCS | Mod: CPTII,,, | Performed by: NURSE PRACTITIONER

## 2022-10-27 PROCEDURE — 3079F DIAST BP 80-89 MM HG: CPT | Mod: CPTII,,, | Performed by: NURSE PRACTITIONER

## 2022-10-27 PROCEDURE — 99213 OFFICE O/P EST LOW 20 MIN: CPT | Mod: ,,, | Performed by: NURSE PRACTITIONER

## 2022-10-27 PROCEDURE — 3044F HG A1C LEVEL LT 7.0%: CPT | Mod: CPTII,,, | Performed by: NURSE PRACTITIONER

## 2022-10-27 PROCEDURE — 3075F PR MOST RECENT SYSTOLIC BLOOD PRESS GE 130-139MM HG: ICD-10-PCS | Mod: CPTII,,, | Performed by: NURSE PRACTITIONER

## 2022-10-27 PROCEDURE — 3075F SYST BP GE 130 - 139MM HG: CPT | Mod: CPTII,,, | Performed by: NURSE PRACTITIONER

## 2022-10-27 PROCEDURE — 99213 PR OFFICE/OUTPT VISIT, EST, LEVL III, 20-29 MIN: ICD-10-PCS | Mod: ,,, | Performed by: NURSE PRACTITIONER

## 2022-10-27 PROCEDURE — 99214 OFFICE O/P EST MOD 30 MIN: CPT | Mod: ,,, | Performed by: NURSE PRACTITIONER

## 2022-10-27 PROCEDURE — 4010F ACE/ARB THERAPY RXD/TAKEN: CPT | Mod: CPTII,,, | Performed by: NURSE PRACTITIONER

## 2022-10-27 PROCEDURE — 4010F PR ACE/ARB THEARPY RXD/TAKEN: ICD-10-PCS | Mod: CPTII,,, | Performed by: NURSE PRACTITIONER

## 2022-10-27 PROCEDURE — 1159F MED LIST DOCD IN RCRD: CPT | Mod: CPTII,,, | Performed by: NURSE PRACTITIONER

## 2022-10-27 PROCEDURE — 73590 XR TIBIA FIBULA 2 VIEW RIGHT: ICD-10-PCS | Mod: 26,RT,, | Performed by: RADIOLOGY

## 2022-10-27 PROCEDURE — 3079F PR MOST RECENT DIASTOLIC BLOOD PRESSURE 80-89 MM HG: ICD-10-PCS | Mod: CPTII,,, | Performed by: NURSE PRACTITIONER

## 2022-10-27 PROCEDURE — 99214 PR OFFICE/OUTPT VISIT, EST, LEVL IV, 30-39 MIN: ICD-10-PCS | Mod: ,,, | Performed by: NURSE PRACTITIONER

## 2022-10-27 PROCEDURE — 1160F RVW MEDS BY RX/DR IN RCRD: CPT | Mod: CPTII,,, | Performed by: NURSE PRACTITIONER

## 2022-10-27 PROCEDURE — 1160F PR REVIEW ALL MEDS BY PRESCRIBER/CLIN PHARMACIST DOCUMENTED: ICD-10-PCS | Mod: CPTII,,, | Performed by: NURSE PRACTITIONER

## 2022-10-27 RX ORDER — CEFUROXIME AXETIL 500 MG/1
500 TABLET ORAL EVERY 12 HOURS
Qty: 20 TABLET | Refills: 0 | Status: SHIPPED | OUTPATIENT
Start: 2022-10-27 | End: 2023-03-01 | Stop reason: ALTCHOICE

## 2022-10-27 RX ORDER — METFORMIN HYDROCHLORIDE 500 MG/1
500 TABLET ORAL
Qty: 90 TABLET | Refills: 1 | Status: SHIPPED | OUTPATIENT
Start: 2022-10-27 | End: 2023-03-01 | Stop reason: SDUPTHER

## 2022-10-27 RX ORDER — FLUTICASONE PROPIONATE 50 MCG
1 SPRAY, SUSPENSION (ML) NASAL DAILY
Qty: 16 G | Refills: 0 | Status: SHIPPED | OUTPATIENT
Start: 2022-10-27

## 2022-10-27 NOTE — PROGRESS NOTES
Subjective:       Patient ID: Samara iLz is a 52 y.o. female.    Chief Complaint: Follow-up (Would like to talk about weight loss med(s)/Would also like some help with leaking urine after sneezing)    Ms. Liz presents to clinic with complaints of headache, nasal congestion, cough X 2 weeks. She is interested in losing weight, reports she is walking 3-5 miles 3 times a week. Weight is down 4#. Most recent labs reviewed with patient today, will start metformin for prediabetes and counseled on diet. She complains of acid reflux symptoms, complains of leg cramps, complains of muscle spasm to low back. She reports regular bowel movements. She states she is taking nexium OTC for GERD symptoms. Has colonoscopy scheduled. She also complains of stress incontinence from sneezing and coughing.    Review of Systems   Constitutional: Negative.    Respiratory: Negative.     Cardiovascular: Negative.    Gastrointestinal:  Positive for constipation and reflux. Negative for diarrhea, nausea and vomiting.   Genitourinary: Negative.    Musculoskeletal:  Positive for back pain and myalgias.   Neurological: Negative.    Psychiatric/Behavioral: Negative.         Objective:      Physical Exam  Vitals and nursing note reviewed.   Constitutional:       Appearance: Normal appearance. She is obese.   HENT:      Head: Normocephalic.      Right Ear: Tympanic membrane normal.      Left Ear: Tympanic membrane normal.      Nose: Congestion present.      Mouth/Throat:      Mouth: Mucous membranes are moist.      Pharynx: Posterior oropharyngeal erythema present.   Cardiovascular:      Rate and Rhythm: Normal rate and regular rhythm.      Pulses: Normal pulses.      Heart sounds: Normal heart sounds.   Pulmonary:      Effort: Pulmonary effort is normal.      Breath sounds: Normal breath sounds.   Abdominal:      General: Bowel sounds are normal.      Palpations: Abdomen is soft.      Tenderness: There is no abdominal tenderness.    Skin:     General: Skin is warm and dry.   Neurological:      Mental Status: She is alert and oriented to person, place, and time.   Psychiatric:         Behavior: Behavior normal.       Assessment:       Problem List Items Addressed This Visit    None  Visit Diagnoses       Sinusitis, unspecified chronicity, unspecified location    -  Primary    Prediabetes        Relevant Orders    Hemoglobin A1C    Comprehensive Metabolic Panel    Hypothyroidism, unspecified type        Relevant Orders    TSH    Dyslipidemia        Relevant Orders    Lipid Panel    Gastroesophageal reflux disease, unspecified whether esophagitis present        Hypertension, unspecified type        Leg cramping        Relevant Orders    Magnesium            Plan:        Sinusitis - flonase daily, ceftin 500mg BID X 10 days.   Start metformin 500mg QD, 1/2 tablet with a meal and increase to 1 tablet with meals as tolerated.   BP controlled, continue current medications.   Continue exercise 3-5 times a week, keep food diary with calorie count.

## 2022-10-27 NOTE — PROGRESS NOTES
Subjective:       Patient ID: Samara Liz is a 52 y.o. female.    Chief Complaint: Work Related Injury (Patient presents here today with work related injury to right leg that occurred on 10/25/22 Patient states she hit her leg on a piece of wood.)    53 y/o bf presents with an abrasion to right shin. She states that on Tuesday she hit her leg on a pallet while at work. She has been keeping it clean and dry but complains it is very sore and painful.     Review of Systems   Integumentary:  Positive for wound.   All other systems reviewed and are negative.      Objective:      Physical Exam  Vitals and nursing note reviewed.   Constitutional:       Appearance: Normal appearance.   HENT:      Head: Normocephalic.   Eyes:      Pupils: Pupils are equal, round, and reactive to light.   Cardiovascular:      Rate and Rhythm: Normal rate and regular rhythm.      Heart sounds: Normal heart sounds.   Pulmonary:      Effort: Pulmonary effort is normal.      Breath sounds: Normal breath sounds.   Musculoskeletal:         General: Tenderness and signs of injury present. No swelling. Normal range of motion.      Cervical back: Normal range of motion.      Right lower leg: Swelling, laceration, tenderness and bony tenderness present.        Legs:    Skin:     General: Skin is warm and dry.   Neurological:      General: No focal deficit present.      Mental Status: She is alert and oriented to person, place, and time.   Psychiatric:         Attention and Perception: Attention and perception normal.         Mood and Affect: Mood normal.         Speech: Speech normal.         Behavior: Behavior normal. Behavior is cooperative.         Cognition and Memory: Cognition normal.         Judgment: Judgment normal.     Imaging: X-Ray Tibia Fibula 2 View Right    Result Date: 10/27/2022  EXAMINATION: XR TIBIA FIBULA 2 VIEW RIGHT CLINICAL HISTORY: Contusion of right lower leg, initial encounter COMPARISON: None TECHNIQUE: Frontal and  lateral views of the right tib fib. FINDINGS: No convincing acute fracture or dislocation demonstrated. No concerning radiopaque foreign body visualized.     As above. Point of Service: [facility] Electronically signed by: Carroll Ryder Date:    10/27/2022 Time:    09:51      Assessment:       Problem List Items Addressed This Visit          Other    Contusion of right leg, initial encounter - Primary    Relevant Orders    X-Ray Tibia Fibula 2 View Right       Plan:       RTW full duty. RTN to [employer]. Continue to keep wound covered at work and open to air at home. Keep it clean and dry.

## 2022-10-27 NOTE — LETTER
October 27, 2022    Samara Liz  [address]  [place] [address]         [facility]  [address]  [place] [address]  Phone: [phone]  Fax: [phone] October 27, 2022     Patient: Samara Pateln   YOB: 1970   Date of Visit: 10/27/2022       To Whom It May Concern:    It is my medical opinion that Samara Liz may return to full duty immediately with no restrictions.    If you have any questions or concerns, please don't hesitate to call.    Sincerely,        CAROL ANN Caldera

## 2023-03-01 ENCOUNTER — OFFICE VISIT (OUTPATIENT)
Dept: FAMILY MEDICINE | Facility: CLINIC | Age: 53
End: 2023-03-01
Payer: COMMERCIAL

## 2023-03-01 VITALS
RESPIRATION RATE: 18 BRPM | OXYGEN SATURATION: 96 % | WEIGHT: 243 LBS | DIASTOLIC BLOOD PRESSURE: 100 MMHG | TEMPERATURE: 98 F | HEART RATE: 90 BPM | HEIGHT: 66 IN | SYSTOLIC BLOOD PRESSURE: 150 MMHG | BODY MASS INDEX: 39.05 KG/M2

## 2023-03-01 DIAGNOSIS — R21 RASH, SKIN: Primary | ICD-10-CM

## 2023-03-01 DIAGNOSIS — E66.9 OBESITY (BMI 35.0-39.9 WITHOUT COMORBIDITY): ICD-10-CM

## 2023-03-01 DIAGNOSIS — R73.03 PREDIABETES: ICD-10-CM

## 2023-03-01 DIAGNOSIS — E78.5 DYSLIPIDEMIA: ICD-10-CM

## 2023-03-01 DIAGNOSIS — I10 PRIMARY HYPERTENSION: ICD-10-CM

## 2023-03-01 PROCEDURE — 1159F PR MEDICATION LIST DOCUMENTED IN MEDICAL RECORD: ICD-10-PCS | Mod: CPTII,,, | Performed by: NURSE PRACTITIONER

## 2023-03-01 PROCEDURE — 3080F PR MOST RECENT DIASTOLIC BLOOD PRESSURE >= 90 MM HG: ICD-10-PCS | Mod: CPTII,,, | Performed by: NURSE PRACTITIONER

## 2023-03-01 PROCEDURE — 99213 OFFICE O/P EST LOW 20 MIN: CPT | Mod: ,,, | Performed by: NURSE PRACTITIONER

## 2023-03-01 PROCEDURE — 3077F PR MOST RECENT SYSTOLIC BLOOD PRESSURE >= 140 MM HG: ICD-10-PCS | Mod: CPTII,,, | Performed by: NURSE PRACTITIONER

## 2023-03-01 PROCEDURE — 3008F BODY MASS INDEX DOCD: CPT | Mod: CPTII,,, | Performed by: NURSE PRACTITIONER

## 2023-03-01 PROCEDURE — 3008F PR BODY MASS INDEX (BMI) DOCUMENTED: ICD-10-PCS | Mod: CPTII,,, | Performed by: NURSE PRACTITIONER

## 2023-03-01 PROCEDURE — 1159F MED LIST DOCD IN RCRD: CPT | Mod: CPTII,,, | Performed by: NURSE PRACTITIONER

## 2023-03-01 PROCEDURE — 3077F SYST BP >= 140 MM HG: CPT | Mod: CPTII,,, | Performed by: NURSE PRACTITIONER

## 2023-03-01 PROCEDURE — 1160F PR REVIEW ALL MEDS BY PRESCRIBER/CLIN PHARMACIST DOCUMENTED: ICD-10-PCS | Mod: CPTII,,, | Performed by: NURSE PRACTITIONER

## 2023-03-01 PROCEDURE — 99213 PR OFFICE/OUTPT VISIT, EST, LEVL III, 20-29 MIN: ICD-10-PCS | Mod: ,,, | Performed by: NURSE PRACTITIONER

## 2023-03-01 PROCEDURE — 1160F RVW MEDS BY RX/DR IN RCRD: CPT | Mod: CPTII,,, | Performed by: NURSE PRACTITIONER

## 2023-03-01 PROCEDURE — 3080F DIAST BP >= 90 MM HG: CPT | Mod: CPTII,,, | Performed by: NURSE PRACTITIONER

## 2023-03-01 RX ORDER — METFORMIN HYDROCHLORIDE 500 MG/1
500 TABLET ORAL
Qty: 90 TABLET | Refills: 1 | Status: SHIPPED | OUTPATIENT
Start: 2023-03-01 | End: 2024-02-29

## 2023-03-01 RX ORDER — SULFAMETHOXAZOLE AND TRIMETHOPRIM 800; 160 MG/1; MG/1
1 TABLET ORAL 2 TIMES DAILY
Qty: 14 TABLET | Refills: 0 | Status: SHIPPED | OUTPATIENT
Start: 2023-03-01 | End: 2023-03-09 | Stop reason: ALTCHOICE

## 2023-03-01 RX ORDER — ATORVASTATIN CALCIUM 20 MG/1
20 TABLET, FILM COATED ORAL DAILY
Qty: 90 TABLET | Refills: 1 | Status: SHIPPED | OUTPATIENT
Start: 2023-03-01 | End: 2024-02-29

## 2023-03-01 RX ORDER — AMLODIPINE BESYLATE 5 MG/1
5 TABLET ORAL DAILY
Qty: 90 TABLET | Refills: 1 | Status: SHIPPED | OUTPATIENT
Start: 2023-03-01 | End: 2024-02-29

## 2023-03-01 RX ORDER — TRIAMCINOLONE ACETONIDE 1 MG/G
OINTMENT TOPICAL 2 TIMES DAILY
Qty: 15 G | Refills: 0 | Status: SHIPPED | OUTPATIENT
Start: 2023-03-01

## 2023-03-02 PROBLEM — E78.5 DYSLIPIDEMIA: Status: ACTIVE | Noted: 2023-03-02

## 2023-03-02 PROBLEM — S80.11XA CONTUSION OF RIGHT LEG, INITIAL ENCOUNTER: Status: RESOLVED | Noted: 2022-10-27 | Resolved: 2023-03-02

## 2023-03-02 PROBLEM — E66.9 OBESITY (BMI 35.0-39.9 WITHOUT COMORBIDITY): Status: ACTIVE | Noted: 2023-03-02

## 2023-03-02 PROBLEM — I16.0 HYPERTENSIVE URGENCY: Status: RESOLVED | Noted: 2022-04-25 | Resolved: 2023-03-02

## 2023-03-02 PROBLEM — I10 PRIMARY HYPERTENSION: Status: ACTIVE | Noted: 2023-03-02

## 2023-03-02 PROBLEM — R73.03 PREDIABETES: Status: ACTIVE | Noted: 2023-03-02

## 2023-03-02 NOTE — PROGRESS NOTES
"   CAROL ANN Andrews        PATIENT NAME: Samara Liz  : 1970  DATE: 3/1/23  MRN: [id number]      Billing Provider: CARO LANN Andrews  Level of Service: HI OFFICE/OUTPT VISIT, EST LEVTERRIE III, 20-29 MIN  Patient PCP Information       Provider PCP Type    CAROL ANN Andrews [facility]            Reason for Visit / Chief Complaint: Rash (On back of left arm just above elbow and forearm just below wrist. Reports woke up with it this morning.)         History of Present Illness / Problem Focused Workflow     Samara Liz presents to the clinic with Rash (On back of left arm just above elbow and forearm just below wrist. Reports woke up with it this morning.)     Ms. Liz presents with warm, erythematous, pruritic areas to left forearm, unknown cause - onset one day ago. She states she has gained weight since starting metformin, no longer wants to take it. Blood pressure remains elevated. She is not keep food journal or calorie count, states she "doesn't eat much", but interested in weight loss. LDL remained extremely high on last labs, the patient agrees to start statin.    Rash  This is a new problem. The current episode started yesterday. The affected locations include the left arm. The rash is characterized by redness, swelling and itchiness. It is unknown if there was an exposure to a precipitant. Pertinent negatives include no cough, facial edema, fever, joint pain or shortness of breath. Past treatments include nothing. The treatment provided no relief. There is no history of allergies, asthma or eczema.     Review of Systems     Review of Systems   Constitutional:  Negative for chills and fever.   Respiratory:  Negative for cough and shortness of breath.    Gastrointestinal: Negative.    Genitourinary: Negative.    Musculoskeletal: Negative.  Negative for joint pain.   Integumentary:  Positive for rash.   Neurological: Negative.    Psychiatric/Behavioral: Negative.        Medical / " Social / Family History     Past Medical History:   Diagnosis Date    Arthritis     Hypertension        Past Surgical History:   Procedure Laterality Date    ARTHROSCOPY OF KNEE Left 01/04/2022    Procedure: ARTHROSCOPY, KNEE;  Surgeon: Joseph Gonzalez MD;  Location: [facility];  Service: Orthopedics;  Laterality: Left;    EXCISION OF MEDIAL MENISCUS OF KNEE Left 01/04/2022    Procedure: MENISCECTOMY, KNEE, MEDIAL LATERAL;  Surgeon: Joseph Gonzalez MD;  Location: [facility];  Service: Orthopedics;  Laterality: Left;    HYSTERECTOMY      knee scope         Social History  Ms. Samara Liz   reports that she has never smoked. She has never used smokeless tobacco. She reports that she does not drink alcohol and does not use drugs.    Family History  Ms.'s Samara Liz  family history includes Breast cancer in her sister; Diabetes in her mother and sister; Hypertension in her sister.    Medications and Allergies     Medications  Outpatient Medications Marked as Taking for the 3/1/23 encounter (Office Visit) with CAROL ANN Andrews   Medication Sig Dispense Refill    fluticasone propionate (FLONASE) 50 mcg/actuation nasal spray 1 spray (50 mcg total) by Each Nostril route once daily. 16 g 0    lisinopriL-hydrochlorothiazide (PRINZIDE,ZESTORETIC) 20-12.5 mg per tablet Take 1 tablet by mouth once daily. 90 tablet 3       Allergies  Review of patient's allergies indicates:  No Known Allergies      Vitals:    03/01/23 1414   BP: (!) 150/100   Pulse:    Resp:    Temp:      Physical Exam  Vitals and nursing note reviewed.   Constitutional:       Appearance: Normal appearance. She is obese.   HENT:      Head: Normocephalic.   Cardiovascular:      Rate and Rhythm: Normal rate and regular rhythm.      Heart sounds: Normal heart sounds.   Pulmonary:      Effort: Pulmonary effort is normal.      Breath sounds: Normal breath sounds.   Abdominal:      General: Bowel sounds are normal.       Palpations: Abdomen is soft.   Musculoskeletal:         General: Normal range of motion.   Skin:     General: Skin is warm and dry.      Findings: Erythema and rash present.          Neurological:      Mental Status: She is alert and oriented to person, place, and time.   Psychiatric:         Behavior: Behavior normal.          Lab Results   Component Value Date    WBC 6.93 04/25/2022    HGB 10.9 (L) 04/25/2022    HCT 35.0 (L) 04/25/2022    MCV 81.6 04/25/2022     04/25/2022          Sodium   Date Value Ref Range Status   11/18/2022 138 136 - 145 mmol/L Final     Potassium   Date Value Ref Range Status   11/18/2022 4.2 3.5 - 5.1 mmol/L Final     Chloride   Date Value Ref Range Status   11/18/2022 105 98 - 107 mmol/L Final     CO2   Date Value Ref Range Status   11/18/2022 27 21 - 32 mmol/L Final     Glucose   Date Value Ref Range Status   11/18/2022 102 74 - 106 mg/dL Final     BUN   Date Value Ref Range Status   11/18/2022 16 7 - 18 mg/dL Final     Creatinine   Date Value Ref Range Status   11/18/2022 0.92 0.55 - 1.02 mg/dL Final     Calcium   Date Value Ref Range Status   11/18/2022 9.3 8.5 - 10.1 mg/dL Final     Total Protein   Date Value Ref Range Status   11/18/2022 7.4 6.4 - 8.2 g/dL Final     Albumin   Date Value Ref Range Status   11/18/2022 3.8 3.5 - 5.0 g/dL Final     Bilirubin, Total   Date Value Ref Range Status   11/18/2022 0.4 >0.0 - 1.2 mg/dL Final     Alk Phos   Date Value Ref Range Status   11/18/2022 95 41 - 108 U/L Final     AST   Date Value Ref Range Status   11/18/2022 15 15 - 37 U/L Final     ALT   Date Value Ref Range Status   11/18/2022 30 13 - 56 U/L Final     Anion Gap   Date Value Ref Range Status   11/18/2022 10 7 - 16 mmol/L Final     eGFR   Date Value Ref Range Status   11/18/2022 74 >=60 mL/min/1.73m² Final        Lab Results   Component Value Date    CHOL 265 (H) 11/18/2022    CHOL 227 (H) 02/28/2022     Lab Results   Component Value Date    HDL 42 11/18/2022    HDL 38 (L)  02/28/2022     Lab Results   Component Value Date    LDLCALC 190 11/18/2022    LDLCALC 158 02/28/2022     Lab Results   Component Value Date    TRIG 163 (H) 11/18/2022    TRIG 156 (H) 02/28/2022     Lab Results   Component Value Date    CHOLHDL 6.3 11/18/2022    CHOLHDL 6.0 02/28/2022        Lab Results   Component Value Date    HGBA1C 6.4 11/18/2022        Lab Results   Component Value Date    TSH 3.190 11/18/2022        No results found for: PSA, PSATOTAL, PSAFREE, PSAFREEPCT       Health Maintenance Due   Topic Date Due    Hepatitis C Screening  Never done    Mammogram  03/04/2023       Problem List Items Addressed This Visit          Cardiac/Vascular    Dyslipidemia    Current Assessment & Plan     Did not tolerate pravastatin (gi upset), start atorvastatin 20mg QHS. Low fat/low chol diet, weight loss, regular exercise regimen.         Primary hypertension    Current Assessment & Plan     Add amlodipine 5mg QD, continue zestoretic 20-12.5mg.            Endocrine    Obesity (BMI 35.0-39.9 without comorbidity)    Prediabetes    Current Assessment & Plan     Continue metformin.          Other Visit Diagnoses       Rash, skin    -  Primary          Rash - may take antihistamine for itching as needed, start bactrim ds as prescribed, triamcinolone cream BID to left forearm.      Health Maintenance Topics with due status: Not Due       Topic Last Completion Date    Colorectal Cancer Screening 11/11/2022    Hemoglobin A1c (Prediabetes) 11/18/2022    Lipid Panel 11/18/2022       Future Appointments   Date Time Provider Department Center   3/9/2023  3:15 PM CAROL ANN Andrews [facility]   3/10/2023 10:15 AM [facility]        There are no Patient Instructions on file for this visit.  No follow-ups on file.       Date of encounter: 3/1/23

## 2023-03-02 NOTE — ASSESSMENT & PLAN NOTE
Did not tolerate pravastatin (gi upset), start atorvastatin 20mg QHS. Low fat/low chol diet, weight loss, regular exercise regimen.

## 2023-03-09 ENCOUNTER — OFFICE VISIT (OUTPATIENT)
Dept: FAMILY MEDICINE | Facility: CLINIC | Age: 53
End: 2023-03-09
Payer: COMMERCIAL

## 2023-03-09 VITALS
TEMPERATURE: 98 F | HEIGHT: 66 IN | HEART RATE: 72 BPM | WEIGHT: 243 LBS | DIASTOLIC BLOOD PRESSURE: 88 MMHG | SYSTOLIC BLOOD PRESSURE: 136 MMHG | RESPIRATION RATE: 18 BRPM | OXYGEN SATURATION: 97 % | BODY MASS INDEX: 39.05 KG/M2

## 2023-03-09 DIAGNOSIS — R73.03 PREDIABETES: ICD-10-CM

## 2023-03-09 DIAGNOSIS — I10 PRIMARY HYPERTENSION: Primary | ICD-10-CM

## 2023-03-09 DIAGNOSIS — E66.9 OBESITY (BMI 35.0-39.9 WITHOUT COMORBIDITY): ICD-10-CM

## 2023-03-09 DIAGNOSIS — E78.5 DYSLIPIDEMIA: ICD-10-CM

## 2023-03-09 PROCEDURE — 1160F RVW MEDS BY RX/DR IN RCRD: CPT | Mod: CPTII,,, | Performed by: NURSE PRACTITIONER

## 2023-03-09 PROCEDURE — 99212 OFFICE O/P EST SF 10 MIN: CPT | Mod: ,,, | Performed by: NURSE PRACTITIONER

## 2023-03-09 PROCEDURE — 3008F PR BODY MASS INDEX (BMI) DOCUMENTED: ICD-10-PCS | Mod: CPTII,,, | Performed by: NURSE PRACTITIONER

## 2023-03-09 PROCEDURE — 3008F BODY MASS INDEX DOCD: CPT | Mod: CPTII,,, | Performed by: NURSE PRACTITIONER

## 2023-03-09 PROCEDURE — 99212 PR OFFICE/OUTPT VISIT, EST, LEVL II, 10-19 MIN: ICD-10-PCS | Mod: ,,, | Performed by: NURSE PRACTITIONER

## 2023-03-09 PROCEDURE — 1159F PR MEDICATION LIST DOCUMENTED IN MEDICAL RECORD: ICD-10-PCS | Mod: CPTII,,, | Performed by: NURSE PRACTITIONER

## 2023-03-09 PROCEDURE — 3079F PR MOST RECENT DIASTOLIC BLOOD PRESSURE 80-89 MM HG: ICD-10-PCS | Mod: CPTII,,, | Performed by: NURSE PRACTITIONER

## 2023-03-09 PROCEDURE — 3079F DIAST BP 80-89 MM HG: CPT | Mod: CPTII,,, | Performed by: NURSE PRACTITIONER

## 2023-03-09 PROCEDURE — 3075F SYST BP GE 130 - 139MM HG: CPT | Mod: CPTII,,, | Performed by: NURSE PRACTITIONER

## 2023-03-09 PROCEDURE — 3075F PR MOST RECENT SYSTOLIC BLOOD PRESS GE 130-139MM HG: ICD-10-PCS | Mod: CPTII,,, | Performed by: NURSE PRACTITIONER

## 2023-03-09 PROCEDURE — 1159F MED LIST DOCD IN RCRD: CPT | Mod: CPTII,,, | Performed by: NURSE PRACTITIONER

## 2023-03-09 PROCEDURE — 1160F PR REVIEW ALL MEDS BY PRESCRIBER/CLIN PHARMACIST DOCUMENTED: ICD-10-PCS | Mod: CPTII,,, | Performed by: NURSE PRACTITIONER

## 2023-03-10 NOTE — PROGRESS NOTES
CAROL ANN Andrews        PATIENT NAME: Samara Liz  : 1970  DATE: 3/9/23  MRN: [id number]      Billing Provider: CAROL ANN Andrews  Level of Service: DE OFFICE/OUTPT VISIT, EST, LEVL II, 10-19 MIN  Patient PCP Information       Provider PCP Type    CAROL ANN Andrews [facility]            Reason for Visit / Chief Complaint: Follow-up (2 week follow up. Reports rash has gotten better form last visit. Would like to discuss possibility of getting ozempic since she was told she is prediabetic)         History of Present Illness / Problem Focused Workflow     Samara Liz presents to the clinic with Follow-up (2 week follow up. Reports rash has gotten better form last visit. Would like to discuss possibility of getting ozempic since she was told she is prediabetic)     Ms. Liz presents in follow up for hypertension after medication change. Blood pressure has improved, she states she has felt more tired over the last week and we discussed that this could be side effect of new medication. She reports dyspnea on exertion that she contributes to sedentary lifestyle and weight gain. She is interested in medical therapy to help with weight loss. She is prediabetic and no longer wants to take metformin.      Review of Systems     Review of Systems   Constitutional:  Positive for fatigue and unexpected weight change.   Respiratory:  Positive for shortness of breath (on exertion). Negative for chest tightness.    Cardiovascular: Negative.  Negative for chest pain and palpitations.   Gastrointestinal: Negative.    Genitourinary: Negative.    Neurological: Negative.    Psychiatric/Behavioral: Negative.        Medical / Social / Family History     Past Medical History:   Diagnosis Date    Arthritis     Hypertension        Past Surgical History:   Procedure Laterality Date    ARTHROSCOPY OF KNEE Left 2022    Procedure: ARTHROSCOPY, KNEE;  Surgeon: Joseph Gonzalez MD;  Location: [facility]  ORTHO OR;  Service: Orthopedics;  Laterality: Left;    EXCISION OF MEDIAL MENISCUS OF KNEE Left 01/04/2022    Procedure: MENISCECTOMY, KNEE, MEDIAL LATERAL;  Surgeon: Joseph Gonzalez MD;  Location: [facility] ORTHO OR;  Service: Orthopedics;  Laterality: Left;    HYSTERECTOMY      knee scope         Social History  Ms. Samara Liz   reports that she has never smoked. She has never used smokeless tobacco. She reports that she does not drink alcohol and does not use drugs.    Family History  Ms.'s Samara Liz  family history includes Breast cancer in her sister; Diabetes in her mother and sister; Hypertension in her sister.    Medications and Allergies     Medications  Outpatient Medications Marked as Taking for the 3/9/23 encounter (Office Visit) with CAROL ANN Andrews   Medication Sig Dispense Refill    amLODIPine (NORVASC) 5 MG tablet Take 1 tablet (5 mg total) by mouth once daily. 90 tablet 1    atorvastatin (LIPITOR) 20 MG tablet Take 1 tablet (20 mg total) by mouth once daily. 90 tablet 1    fluticasone propionate (FLONASE) 50 mcg/actuation nasal spray 1 spray (50 mcg total) by Each Nostril route once daily. 16 g 0    lisinopriL-hydrochlorothiazide (PRINZIDE,ZESTORETIC) 20-12.5 mg per tablet Take 1 tablet by mouth once daily. 90 tablet 3    metFORMIN (GLUCOPHAGE) 500 MG tablet Take 1 tablet (500 mg total) by mouth daily with breakfast. 90 tablet 1    triamcinolone acetonide 0.1% (KENALOG) 0.1 % ointment Apply topically 2 (two) times daily. 15 g 0       Allergies  Review of patient's allergies indicates:  No Known Allergies      Vitals:    03/09/23 1534   BP: 136/88   Pulse: 72   Resp: 18   Temp: 98.3 °F (36.8 °C)     Physical Exam  Vitals reviewed.   Constitutional:       Appearance: Normal appearance. She is obese.   HENT:      Head: Normocephalic.   Cardiovascular:      Rate and Rhythm: Normal rate and regular rhythm.      Pulses: Normal pulses.      Heart sounds: Normal heart sounds.    Pulmonary:      Effort: Pulmonary effort is normal.      Breath sounds: Normal breath sounds.   Skin:     General: Skin is warm and dry.   Neurological:      Mental Status: She is alert and oriented to person, place, and time.   Psychiatric:         Behavior: Behavior normal.          Lab Results   Component Value Date    WBC 6.93 04/25/2022    HGB 10.9 (L) 04/25/2022    HCT 35.0 (L) 04/25/2022    MCV 81.6 04/25/2022     04/25/2022          Sodium   Date Value Ref Range Status   11/18/2022 138 136 - 145 mmol/L Final     Potassium   Date Value Ref Range Status   11/18/2022 4.2 3.5 - 5.1 mmol/L Final     Chloride   Date Value Ref Range Status   11/18/2022 105 98 - 107 mmol/L Final     CO2   Date Value Ref Range Status   11/18/2022 27 21 - 32 mmol/L Final     Glucose   Date Value Ref Range Status   11/18/2022 102 74 - 106 mg/dL Final     BUN   Date Value Ref Range Status   11/18/2022 16 7 - 18 mg/dL Final     Creatinine   Date Value Ref Range Status   11/18/2022 0.92 0.55 - 1.02 mg/dL Final     Calcium   Date Value Ref Range Status   11/18/2022 9.3 8.5 - 10.1 mg/dL Final     Total Protein   Date Value Ref Range Status   11/18/2022 7.4 6.4 - 8.2 g/dL Final     Albumin   Date Value Ref Range Status   11/18/2022 3.8 3.5 - 5.0 g/dL Final     Bilirubin, Total   Date Value Ref Range Status   11/18/2022 0.4 >0.0 - 1.2 mg/dL Final     Alk Phos   Date Value Ref Range Status   11/18/2022 95 41 - 108 U/L Final     AST   Date Value Ref Range Status   11/18/2022 15 15 - 37 U/L Final     ALT   Date Value Ref Range Status   11/18/2022 30 13 - 56 U/L Final     Anion Gap   Date Value Ref Range Status   11/18/2022 10 7 - 16 mmol/L Final     eGFR   Date Value Ref Range Status   11/18/2022 74 >=60 mL/min/1.73m² Final        Lab Results   Component Value Date    CHOL 265 (H) 11/18/2022    CHOL 227 (H) 02/28/2022     Lab Results   Component Value Date    HDL 42 11/18/2022    HDL 38 (L) 02/28/2022     Lab Results   Component Value  Date    LDLCALC 190 11/18/2022    LDLCALC 158 02/28/2022     Lab Results   Component Value Date    TRIG 163 (H) 11/18/2022    TRIG 156 (H) 02/28/2022     Lab Results   Component Value Date    CHOLHDL 6.3 11/18/2022    CHOLHDL 6.0 02/28/2022        Lab Results   Component Value Date    HGBA1C 6.4 11/18/2022        Lab Results   Component Value Date    TSH 3.190 11/18/2022        No results found for: PSA, PSATOTAL, PSAFREE, PSAFREEPCT       Health Maintenance Due   Topic Date Due    Hepatitis C Screening  Never done    Mammogram  03/04/2023       Problem List Items Addressed This Visit          Cardiac/Vascular    Dyslipidemia    Primary hypertension - Primary       Endocrine    Obesity (BMI 35.0-39.9 without comorbidity)    Prediabetes     Blood pressure improved, pt. Instructed to schedule appt. With weight loss clinic.      Health Maintenance Topics with due status: Not Due       Topic Last Completion Date    Colorectal Cancer Screening 11/11/2022    Hemoglobin A1c (Prediabetes) 11/18/2022    Lipid Panel 11/18/2022       Future Appointments   Date Time Provider Department Center   3/10/2023 10:15 AM [facility]   3/16/2023  3:45 PM Joni Watts MD [facility]        There are no Patient Instructions on file for this visit.  No follow-ups on file.       Date of encounter: 3/9/23

## 2023-03-16 ENCOUNTER — OFFICE VISIT (OUTPATIENT)
Dept: FAMILY MEDICINE | Facility: CLINIC | Age: 53
End: 2023-03-16
Payer: COMMERCIAL

## 2023-03-16 VITALS
RESPIRATION RATE: 18 BRPM | OXYGEN SATURATION: 95 % | BODY MASS INDEX: 39.05 KG/M2 | SYSTOLIC BLOOD PRESSURE: 136 MMHG | HEART RATE: 86 BPM | DIASTOLIC BLOOD PRESSURE: 86 MMHG | TEMPERATURE: 98 F | WEIGHT: 243 LBS | HEIGHT: 66 IN

## 2023-03-16 DIAGNOSIS — I10 PRIMARY HYPERTENSION: ICD-10-CM

## 2023-03-16 DIAGNOSIS — R73.03 PREDIABETES: Primary | ICD-10-CM

## 2023-03-16 LAB
EST. AVERAGE GLUCOSE BLD GHB EST-MCNC: 137 MG/DL
HBA1C MFR BLD HPLC: 6.7 % (ref 4.5–6.6)

## 2023-03-16 PROCEDURE — 4010F PR ACE/ARB THEARPY RXD/TAKEN: ICD-10-PCS | Mod: CPTII,,, | Performed by: FAMILY MEDICINE

## 2023-03-16 PROCEDURE — 3008F BODY MASS INDEX DOCD: CPT | Mod: CPTII,,, | Performed by: FAMILY MEDICINE

## 2023-03-16 PROCEDURE — 83036 HEMOGLOBIN GLYCOSYLATED A1C: CPT | Mod: ,,, | Performed by: CLINICAL MEDICAL LABORATORY

## 2023-03-16 PROCEDURE — 99213 OFFICE O/P EST LOW 20 MIN: CPT | Mod: ,,, | Performed by: FAMILY MEDICINE

## 2023-03-16 PROCEDURE — 4010F ACE/ARB THERAPY RXD/TAKEN: CPT | Mod: CPTII,,, | Performed by: FAMILY MEDICINE

## 2023-03-16 PROCEDURE — 83036 HEMOGLOBIN A1C: ICD-10-PCS | Mod: ,,, | Performed by: CLINICAL MEDICAL LABORATORY

## 2023-03-16 PROCEDURE — 1159F MED LIST DOCD IN RCRD: CPT | Mod: CPTII,,, | Performed by: FAMILY MEDICINE

## 2023-03-16 PROCEDURE — 3008F PR BODY MASS INDEX (BMI) DOCUMENTED: ICD-10-PCS | Mod: CPTII,,, | Performed by: FAMILY MEDICINE

## 2023-03-16 PROCEDURE — 3075F PR MOST RECENT SYSTOLIC BLOOD PRESS GE 130-139MM HG: ICD-10-PCS | Mod: CPTII,,, | Performed by: FAMILY MEDICINE

## 2023-03-16 PROCEDURE — 1160F PR REVIEW ALL MEDS BY PRESCRIBER/CLIN PHARMACIST DOCUMENTED: ICD-10-PCS | Mod: CPTII,,, | Performed by: FAMILY MEDICINE

## 2023-03-16 PROCEDURE — 3075F SYST BP GE 130 - 139MM HG: CPT | Mod: CPTII,,, | Performed by: FAMILY MEDICINE

## 2023-03-16 PROCEDURE — 3079F PR MOST RECENT DIASTOLIC BLOOD PRESSURE 80-89 MM HG: ICD-10-PCS | Mod: CPTII,,, | Performed by: FAMILY MEDICINE

## 2023-03-16 PROCEDURE — 99213 PR OFFICE/OUTPT VISIT, EST, LEVL III, 20-29 MIN: ICD-10-PCS | Mod: ,,, | Performed by: FAMILY MEDICINE

## 2023-03-16 PROCEDURE — 3079F DIAST BP 80-89 MM HG: CPT | Mod: CPTII,,, | Performed by: FAMILY MEDICINE

## 2023-03-16 PROCEDURE — 1160F RVW MEDS BY RX/DR IN RCRD: CPT | Mod: CPTII,,, | Performed by: FAMILY MEDICINE

## 2023-03-16 PROCEDURE — 1159F PR MEDICATION LIST DOCUMENTED IN MEDICAL RECORD: ICD-10-PCS | Mod: CPTII,,, | Performed by: FAMILY MEDICINE

## 2023-03-16 RX ORDER — LISINOPRIL AND HYDROCHLOROTHIAZIDE 12.5; 2 MG/1; MG/1
1 TABLET ORAL DAILY
Qty: 90 TABLET | Refills: 3 | Status: SHIPPED | OUTPATIENT
Start: 2023-03-16 | End: 2024-03-15

## 2023-03-16 RX ORDER — SEMAGLUTIDE 1.34 MG/ML
0.25 INJECTION, SOLUTION SUBCUTANEOUS
Qty: 1.5 EACH | Refills: 6 | Status: SHIPPED | OUTPATIENT
Start: 2023-03-16 | End: 2023-03-17 | Stop reason: SDUPTHER

## 2023-03-16 NOTE — PROGRESS NOTES
Samara Liz is a 52 y.o. female seen today for follow-up on her prediabetes.  Her last A1c was 6.4 and we will repeat that today.  We did sit down to discuss the proper diet as well as a mechanism and etiology of diabetes type 2.  We discussed a trial of Ozempic and reviewed the side effects and proper use of that medication.  The patient was instructed on the proper use of the Ozempic pen.      Past Medical History:   Diagnosis Date    Arthritis     Hypertension      Family History   Problem Relation Age of Onset    Diabetes Mother     Breast cancer Sister     Diabetes Sister     Hypertension Sister      Current Outpatient Medications on File Prior to Visit   Medication Sig Dispense Refill    amLODIPine (NORVASC) 5 MG tablet Take 1 tablet (5 mg total) by mouth once daily. 90 tablet 1    atorvastatin (LIPITOR) 20 MG tablet Take 1 tablet (20 mg total) by mouth once daily. 90 tablet 1    fluticasone propionate (FLONASE) 50 mcg/actuation nasal spray 1 spray (50 mcg total) by Each Nostril route once daily. 16 g 0    triamcinolone acetonide 0.1% (KENALOG) 0.1 % ointment Apply topically 2 (two) times daily. 15 g 0    [DISCONTINUED] lisinopriL-hydrochlorothiazide (PRINZIDE,ZESTORETIC) 20-12.5 mg per tablet Take 1 tablet by mouth once daily. 90 tablet 3    metFORMIN (GLUCOPHAGE) 500 MG tablet Take 1 tablet (500 mg total) by mouth daily with breakfast. (Patient not taking: Reported on 3/16/2023) 90 tablet 1     No current facility-administered medications on file prior to visit.       There is no immunization history on file for this patient.    Review of Systems   Constitutional:  Positive for malaise/fatigue. Negative for fever and weight loss.   Respiratory:  Negative for shortness of breath.    Cardiovascular:  Negative for chest pain and palpitations.   Gastrointestinal:  Negative for nausea and vomiting.   Psychiatric/Behavioral:  Negative for depression.       Vitals:    03/16/23 1529   BP: 136/86   Pulse:  86   Resp: 18   Temp: 98.2 °F (36.8 °C)       Physical Exam  Vitals reviewed.   Constitutional:       Appearance: Normal appearance.   HENT:      Head: Normocephalic.   Eyes:      Extraocular Movements: Extraocular movements intact.      Conjunctiva/sclera: Conjunctivae normal.      Pupils: Pupils are equal, round, and reactive to light.   Neck:      Thyroid: No thyroid mass or thyromegaly.   Cardiovascular:      Rate and Rhythm: Normal rate and regular rhythm.      Heart sounds: Normal heart sounds. No murmur heard.    No gallop.   Pulmonary:      Effort: Pulmonary effort is normal. No respiratory distress.      Breath sounds: Normal breath sounds. No wheezing or rales.   Skin:     General: Skin is warm and dry.      Coloration: Skin is not jaundiced or pale.   Neurological:      Mental Status: She is alert.   Psychiatric:         Mood and Affect: Mood normal.         Behavior: Behavior normal.         Thought Content: Thought content normal.         Judgment: Judgment normal.        Assessment and Plan  Prediabetes  -     semaglutide (OZEMPIC) 0.25 mg or 0.5 mg(2 mg/1.5 mL) pen injector; Inject 0.25 mg into the skin every 7 days.  Dispense: 1.5 each; Refill: 6  -     Hemoglobin A1C; Future; Expected date: 03/16/2023    Primary hypertension  -     lisinopriL-hydrochlorothiazide (PRINZIDE,ZESTORETIC) 20-12.5 mg per tablet; Take 1 tablet by mouth once daily.  Dispense: 90 tablet; Refill: 3        Return to clinic in 1 month for follow-up with her home blood glucose log.    Health Maintenance Topics with due status: Not Due       Topic Last Completion Date    Colorectal Cancer Screening 11/11/2022    Hemoglobin A1c (Prediabetes) 11/18/2022    Lipid Panel 11/18/2022

## 2023-03-17 DIAGNOSIS — R73.03 PREDIABETES: ICD-10-CM

## 2023-03-17 RX ORDER — SEMAGLUTIDE 1.34 MG/ML
0.25 INJECTION, SOLUTION SUBCUTANEOUS
Qty: 1.5 EACH | Refills: 6 | Status: SHIPPED | OUTPATIENT
Start: 2023-03-17 | End: 2023-03-23 | Stop reason: SDUPTHER

## 2023-03-17 NOTE — TELEPHONE ENCOUNTER
----- Message from Joni Watts MD sent at 3/17/2023  7:51 AM CDT -----  This patient has transitioned to diabetes.  We will discuss this at her next visit in 1 month.

## 2023-03-23 ENCOUNTER — OFFICE VISIT (OUTPATIENT)
Dept: FAMILY MEDICINE | Facility: CLINIC | Age: 53
End: 2023-03-23
Payer: COMMERCIAL

## 2023-03-23 VITALS
HEIGHT: 66 IN | RESPIRATION RATE: 18 BRPM | OXYGEN SATURATION: 95 % | WEIGHT: 243 LBS | DIASTOLIC BLOOD PRESSURE: 84 MMHG | HEART RATE: 92 BPM | BODY MASS INDEX: 39.05 KG/M2 | SYSTOLIC BLOOD PRESSURE: 124 MMHG | TEMPERATURE: 98 F

## 2023-03-23 DIAGNOSIS — E11.9 TYPE 2 DIABETES MELLITUS WITHOUT COMPLICATION, WITHOUT LONG-TERM CURRENT USE OF INSULIN: ICD-10-CM

## 2023-03-23 PROCEDURE — 3079F PR MOST RECENT DIASTOLIC BLOOD PRESSURE 80-89 MM HG: ICD-10-PCS | Mod: CPTII,,, | Performed by: NURSE PRACTITIONER

## 2023-03-23 PROCEDURE — 1160F PR REVIEW ALL MEDS BY PRESCRIBER/CLIN PHARMACIST DOCUMENTED: ICD-10-PCS | Mod: CPTII,,, | Performed by: NURSE PRACTITIONER

## 2023-03-23 PROCEDURE — 99213 PR OFFICE/OUTPT VISIT, EST, LEVL III, 20-29 MIN: ICD-10-PCS | Mod: ,,, | Performed by: NURSE PRACTITIONER

## 2023-03-23 PROCEDURE — 3079F DIAST BP 80-89 MM HG: CPT | Mod: CPTII,,, | Performed by: NURSE PRACTITIONER

## 2023-03-23 PROCEDURE — 1159F PR MEDICATION LIST DOCUMENTED IN MEDICAL RECORD: ICD-10-PCS | Mod: CPTII,,, | Performed by: NURSE PRACTITIONER

## 2023-03-23 PROCEDURE — 3008F PR BODY MASS INDEX (BMI) DOCUMENTED: ICD-10-PCS | Mod: CPTII,,, | Performed by: NURSE PRACTITIONER

## 2023-03-23 PROCEDURE — 3044F HG A1C LEVEL LT 7.0%: CPT | Mod: CPTII,,, | Performed by: NURSE PRACTITIONER

## 2023-03-23 PROCEDURE — 3008F BODY MASS INDEX DOCD: CPT | Mod: CPTII,,, | Performed by: NURSE PRACTITIONER

## 2023-03-23 PROCEDURE — 4010F ACE/ARB THERAPY RXD/TAKEN: CPT | Mod: CPTII,,, | Performed by: NURSE PRACTITIONER

## 2023-03-23 PROCEDURE — 3074F SYST BP LT 130 MM HG: CPT | Mod: CPTII,,, | Performed by: NURSE PRACTITIONER

## 2023-03-23 PROCEDURE — 1159F MED LIST DOCD IN RCRD: CPT | Mod: CPTII,,, | Performed by: NURSE PRACTITIONER

## 2023-03-23 PROCEDURE — 99213 OFFICE O/P EST LOW 20 MIN: CPT | Mod: ,,, | Performed by: NURSE PRACTITIONER

## 2023-03-23 PROCEDURE — 3044F PR MOST RECENT HEMOGLOBIN A1C LEVEL <7.0%: ICD-10-PCS | Mod: CPTII,,, | Performed by: NURSE PRACTITIONER

## 2023-03-23 PROCEDURE — 1160F RVW MEDS BY RX/DR IN RCRD: CPT | Mod: CPTII,,, | Performed by: NURSE PRACTITIONER

## 2023-03-23 PROCEDURE — 3074F PR MOST RECENT SYSTOLIC BLOOD PRESSURE < 130 MM HG: ICD-10-PCS | Mod: CPTII,,, | Performed by: NURSE PRACTITIONER

## 2023-03-23 PROCEDURE — 4010F PR ACE/ARB THEARPY RXD/TAKEN: ICD-10-PCS | Mod: CPTII,,, | Performed by: NURSE PRACTITIONER

## 2023-03-23 RX ORDER — LANCETS 33 GAUGE
1 EACH MISCELLANEOUS DAILY
Qty: 100 EACH | Refills: 5 | Status: SHIPPED | OUTPATIENT
Start: 2023-03-23

## 2023-03-23 RX ORDER — INSULIN PUMP SYRINGE, 3 ML
1 EACH MISCELLANEOUS DAILY
Qty: 1 EACH | Refills: 0 | Status: SHIPPED | OUTPATIENT
Start: 2023-03-23

## 2023-03-23 RX ORDER — LANCETS 33 GAUGE
EACH MISCELLANEOUS
COMMUNITY
End: 2023-03-23 | Stop reason: SDUPTHER

## 2023-03-23 RX ORDER — ONDANSETRON 4 MG/1
4 TABLET, FILM COATED ORAL EVERY 8 HOURS PRN
Qty: 30 TABLET | Refills: 1 | Status: SHIPPED | OUTPATIENT
Start: 2023-03-23

## 2023-03-23 RX ORDER — SEMAGLUTIDE 1.34 MG/ML
0.25 INJECTION, SOLUTION SUBCUTANEOUS
Qty: 1.5 EACH | Refills: 6 | Status: SHIPPED | OUTPATIENT
Start: 2023-03-23 | End: 2023-07-05

## 2023-03-23 RX ORDER — INSULIN PUMP SYRINGE, 3 ML
EACH MISCELLANEOUS
COMMUNITY
End: 2023-03-23 | Stop reason: SDUPTHER

## 2023-03-23 NOTE — PROGRESS NOTES
CAROL ANN Andrews        PATIENT NAME: Samara Liz  : 1970  DATE: 3/23/23  MRN: [id number]      Billing Provider: CAROL ANN Andrews  Level of Service:   Patient PCP Information       Provider PCP Type    CAROL ANN Andrews [facility]            Reason for Visit / Chief Complaint: Results (Lab review//Care gaps: pt defers HepC screening at this time. Reports she has a mammo scheduled for tomorrow)         History of Present Illness / Problem Focused Workflow     Samara Liz presents to the clinic with Results (Lab review//Care gaps: pt defers HepC screening at this time. Reports she has a mammo scheduled for tomorrow)     The patient presents to review lab results, A1c up to 6.7 and will need a medication adjustment for type 2 diabetes.      Review of Systems     Review of Systems   Constitutional: Negative.    Respiratory: Negative.     Cardiovascular: Negative.    Gastrointestinal: Negative.    Genitourinary: Negative.    Neurological: Negative.    Psychiatric/Behavioral: Negative.        Medical / Social / Family History     Past Medical History:   Diagnosis Date    Arthritis     Diabetes mellitus, type 2     Hypertension        Past Surgical History:   Procedure Laterality Date    ARTHROSCOPY OF KNEE Left 2022    Procedure: ARTHROSCOPY, KNEE;  Surgeon: Joseph Gonzalez MD;  Location: [facility];  Service: Orthopedics;  Laterality: Left;    EXCISION OF MEDIAL MENISCUS OF KNEE Left 2022    Procedure: MENISCECTOMY, KNEE, MEDIAL LATERAL;  Surgeon: Joseph Gonzalez MD;  Location: [facility];  Service: Orthopedics;  Laterality: Left;    HYSTERECTOMY      knee scope         Social History  Ms. Samara Liz   reports that she has never smoked. She has never been exposed to tobacco smoke. She has never used smokeless tobacco. She reports that she does not drink alcohol and does not use drugs.    Family History  Ms.'s Samara Liz  family  history includes Breast cancer in her sister; Diabetes in her mother and sister; Hypertension in her sister.    Medications and Allergies     Medications  Outpatient Medications Marked as Taking for the 3/23/23 encounter (Office Visit) with CAROL ANN Andrews   Medication Sig Dispense Refill    amLODIPine (NORVASC) 5 MG tablet Take 1 tablet (5 mg total) by mouth once daily. 90 tablet 1    atorvastatin (LIPITOR) 20 MG tablet Take 1 tablet (20 mg total) by mouth once daily. 90 tablet 1    fluticasone propionate (FLONASE) 50 mcg/actuation nasal spray 1 spray (50 mcg total) by Each Nostril route once daily. 16 g 0    lisinopriL-hydrochlorothiazide (PRINZIDE,ZESTORETIC) 20-12.5 mg per tablet Take 1 tablet by mouth once daily. 90 tablet 3    metFORMIN (GLUCOPHAGE) 500 MG tablet Take 1 tablet (500 mg total) by mouth daily with breakfast. 90 tablet 1    triamcinolone acetonide 0.1% (KENALOG) 0.1 % ointment Apply topically 2 (two) times daily. 15 g 0       Allergies  Review of patient's allergies indicates:  No Known Allergies      Vitals:    03/23/23 1537   BP: 124/84   Pulse: 92   Resp: 18   Temp: 98.3 °F (36.8 °C)     Physical Exam  Vitals and nursing note reviewed.   Constitutional:       Appearance: Normal appearance. She is obese.   HENT:      Head: Normocephalic.   Cardiovascular:      Rate and Rhythm: Normal rate and regular rhythm.      Heart sounds: Normal heart sounds.   Pulmonary:      Effort: Pulmonary effort is normal.      Breath sounds: Normal breath sounds.   Abdominal:      General: Bowel sounds are normal.      Palpations: Abdomen is soft.   Musculoskeletal:         General: Normal range of motion.   Skin:     General: Skin is warm and dry.   Neurological:      Mental Status: She is alert and oriented to person, place, and time.   Psychiatric:         Behavior: Behavior normal.          Lab Results   Component Value Date    WBC 6.93 04/25/2022    HGB 10.9 (L) 04/25/2022    HCT 35.0 (L) 04/25/2022    MCV  81.6 04/25/2022     04/25/2022          Sodium   Date Value Ref Range Status   11/18/2022 138 136 - 145 mmol/L Final     Potassium   Date Value Ref Range Status   11/18/2022 4.2 3.5 - 5.1 mmol/L Final     Chloride   Date Value Ref Range Status   11/18/2022 105 98 - 107 mmol/L Final     CO2   Date Value Ref Range Status   11/18/2022 27 21 - 32 mmol/L Final     Glucose   Date Value Ref Range Status   11/18/2022 102 74 - 106 mg/dL Final     BUN   Date Value Ref Range Status   11/18/2022 16 7 - 18 mg/dL Final     Creatinine   Date Value Ref Range Status   11/18/2022 0.92 0.55 - 1.02 mg/dL Final     Calcium   Date Value Ref Range Status   11/18/2022 9.3 8.5 - 10.1 mg/dL Final     Total Protein   Date Value Ref Range Status   11/18/2022 7.4 6.4 - 8.2 g/dL Final     Albumin   Date Value Ref Range Status   11/18/2022 3.8 3.5 - 5.0 g/dL Final     Bilirubin, Total   Date Value Ref Range Status   11/18/2022 0.4 >0.0 - 1.2 mg/dL Final     Alk Phos   Date Value Ref Range Status   11/18/2022 95 41 - 108 U/L Final     AST   Date Value Ref Range Status   11/18/2022 15 15 - 37 U/L Final     ALT   Date Value Ref Range Status   11/18/2022 30 13 - 56 U/L Final     Anion Gap   Date Value Ref Range Status   11/18/2022 10 7 - 16 mmol/L Final     eGFR   Date Value Ref Range Status   11/18/2022 74 >=60 mL/min/1.73m² Final        Lab Results   Component Value Date    CHOL 265 (H) 11/18/2022    CHOL 227 (H) 02/28/2022     Lab Results   Component Value Date    HDL 42 11/18/2022    HDL 38 (L) 02/28/2022     Lab Results   Component Value Date    LDLCALC 190 11/18/2022    LDLCALC 158 02/28/2022     Lab Results   Component Value Date    TRIG 163 (H) 11/18/2022    TRIG 156 (H) 02/28/2022     Lab Results   Component Value Date    CHOLHDL 6.3 11/18/2022    CHOLHDL 6.0 02/28/2022        Lab Results   Component Value Date    HGBA1C 6.7 (H) 03/16/2023        Lab Results   Component Value Date    TSH 3.190 11/18/2022              Health  Maintenance Due   Topic Date Due    Hepatitis C Screening  Never done    Mammogram  03/04/2023       Problem List Items Addressed This Visit          Endocrine    Type 2 diabetes mellitus without complication, without long-term current use of insulin    Relevant Medications    semaglutide (OZEMPIC) 0.25 mg or 0.5 mg(2 mg/1.5 mL) pen injector       Continue metformin, start Ozempic 0.25 mg weekly for 4 weeks.  Titrate to 0.5 mg after 4 weeks and repeat A1c and CMP in 3 months.  Reviewed side effects and charted patient to contact clinic, Zofran as needed for nausea.  Please send prescription for glucometer and supplies.  Patient teaching on diabetic diet, regular exercise regimen, calorie deficit, and weight loss.    Health Maintenance Topics with due status: Not Due       Topic Last Completion Date    Colorectal Cancer Screening 11/11/2022    Lipid Panel 11/18/2022    Hemoglobin A1c (Prediabetes) 03/16/2023       Future Appointments   Date Time Provider Department Center   4/17/2023  3:15 PM Joni Watts MD [facility]        There are no Patient Instructions on file for this visit.  No follow-ups on file.       Date of encounter: 3/23/23

## 2023-03-31 ENCOUNTER — TELEPHONE (OUTPATIENT)
Dept: FAMILY MEDICINE | Facility: CLINIC | Age: 53
End: 2023-03-31
Payer: COMMERCIAL

## 2023-03-31 NOTE — TELEPHONE ENCOUNTER
----- Message from Reji Alicia sent at 3/31/2023  9:54 AM CDT -----  This pt called and asked for a call back about a pa    [phone]

## 2023-05-03 ENCOUNTER — HOSPITAL ENCOUNTER (OUTPATIENT)
Dept: RADIOLOGY | Facility: HOSPITAL | Age: 53
Discharge: HOME | End: 2023-05-03
Attending: NURSE PRACTITIONER
Payer: COMMERCIAL

## 2023-05-03 ENCOUNTER — OFFICE VISIT (OUTPATIENT)
Dept: PRIMARY CARE CLINIC | Facility: CLINIC | Age: 53
End: 2023-05-03
Payer: OTHER MISCELLANEOUS

## 2023-05-03 VITALS
RESPIRATION RATE: 18 BRPM | TEMPERATURE: 98 F | HEIGHT: 66 IN | OXYGEN SATURATION: 97 % | DIASTOLIC BLOOD PRESSURE: 87 MMHG | WEIGHT: 243 LBS | SYSTOLIC BLOOD PRESSURE: 147 MMHG | BODY MASS INDEX: 39.05 KG/M2 | HEART RATE: 80 BPM

## 2023-05-03 DIAGNOSIS — M17.11 PRIMARY OSTEOARTHRITIS OF RIGHT KNEE: ICD-10-CM

## 2023-05-03 DIAGNOSIS — R52 PAIN: ICD-10-CM

## 2023-05-03 DIAGNOSIS — R52 PAIN: Primary | ICD-10-CM

## 2023-05-03 PROCEDURE — 73560 X-RAY EXAM OF KNEE 1 OR 2: CPT | Mod: 26,RT,, | Performed by: RADIOLOGY

## 2023-05-03 PROCEDURE — 73560 XR KNEE 1 OR 2 VIEW RIGHT: ICD-10-PCS | Mod: 26,RT,, | Performed by: RADIOLOGY

## 2023-05-03 PROCEDURE — 99213 OFFICE O/P EST LOW 20 MIN: CPT | Mod: ,,, | Performed by: NURSE PRACTITIONER

## 2023-05-03 PROCEDURE — 99213 PR OFFICE/OUTPT VISIT, EST, LEVL III, 20-29 MIN: ICD-10-PCS | Mod: ,,, | Performed by: NURSE PRACTITIONER

## 2023-05-03 PROCEDURE — 73560 X-RAY EXAM OF KNEE 1 OR 2: CPT | Mod: TC,RT

## 2023-05-03 NOTE — PROGRESS NOTES
"Subjective:       Patient ID: Samara Liz is a 53 y.o. female presenting with Work Related Injury (Patient presents here today with work related injury to right knee that occurred 2012. )  .    52 y/o female here for evaluation of right knee pain. This has been ongoing since 2012. She is employed by [employer].  States she has had two prior surgeries on this knee.  Pain increased yesterday after she "stepped wrong".     Review of Systems   Constitutional: Negative.  Negative for appetite change, chills, diaphoresis, fatigue, fever and unexpected weight change.   HENT:  Negative for dental problem, mouth sores, sore throat, trouble swallowing and voice change.    Eyes: Negative.    Respiratory: Negative.     Cardiovascular: Negative.    Gastrointestinal: Negative.  Negative for abdominal distention, abdominal pain, anal bleeding, blood in stool, change in bowel habit, constipation, diarrhea, nausea, rectal pain, vomiting, reflux, fecal incontinence and change in bowel habit.   Endocrine: Negative.    Genitourinary:  Negative for dysuria, frequency and urgency.   Musculoskeletal:  Positive for joint swelling and leg pain.        Right knee pain   Integumentary:  Negative.   Allergic/Immunologic: Negative.    Neurological: Negative.    Hematological: Negative.    Psychiatric/Behavioral: Negative.       PCP:   Jasmin Alejandre   No address on file    Referring MD:  Aaareferral Self  No address on file    Medical History:  has a past medical history of Arthritis, Diabetes mellitus, type 2, and Hypertension.    Surgical History:  has a past surgical history that includes Hysterectomy; knee scope; Arthroscopy of knee (Left, 01/04/2022); and Excision of medial meniscus of knee (Left, 01/04/2022).    Family History: family history includes Breast cancer in her sister; Diabetes in her mother and sister; Hypertension in her sister..     Social History:  reports that she has never smoked. She has never been " exposed to tobacco smoke. She has never used smokeless tobacco. She reports that she does not drink alcohol and does not use drugs.    Review of patient's allergies indicates:  No Known Allergies    Medication List with Changes/Refills   Current Medications    AMLODIPINE (NORVASC) 5 MG TABLET    Take 1 tablet (5 mg total) by mouth once daily.    ATORVASTATIN (LIPITOR) 20 MG TABLET    Take 1 tablet (20 mg total) by mouth once daily.    BLOOD SUGAR DIAGNOSTIC STRP    1 each by Misc.(Non-Drug; Combo Route) route once daily.    BLOOD-GLUCOSE METER KIT    1 each by Other route once daily. Use as instructed    FLUTICASONE PROPIONATE (FLONASE) 50 MCG/ACTUATION NASAL SPRAY    1 spray (50 mcg total) by Each Nostril route once daily.    LANCETS 33 GAUGE MISC    1 lancet by Misc.(Non-Drug; Combo Route) route Daily.    LISINOPRIL-HYDROCHLOROTHIAZIDE (PRINZIDE,ZESTORETIC) 20-12.5 MG PER TABLET    Take 1 tablet by mouth once daily.    METFORMIN (GLUCOPHAGE) 500 MG TABLET    Take 1 tablet (500 mg total) by mouth daily with breakfast.    ONDANSETRON (ZOFRAN) 4 MG TABLET    Take 1 tablet (4 mg total) by mouth every 8 (eight) hours as needed for Nausea.    SEMAGLUTIDE (OZEMPIC) 0.25 MG OR 0.5 MG(2 MG/1.5 ML) PEN INJECTOR    Inject 0.25 mg into the skin every 7 days.    TRIAMCINOLONE ACETONIDE 0.1% (KENALOG) 0.1 % OINTMENT    Apply topically 2 (two) times daily.         Objective:      Physical Exam  Vitals reviewed.   Constitutional:       General: She is not in acute distress.     Appearance: Normal appearance.   HENT:      Head: Normocephalic.      Nose: Nose normal. No congestion.      Mouth/Throat:      Mouth: Mucous membranes are moist.      Pharynx: Oropharynx is clear.   Eyes:      General: No scleral icterus.     Extraocular Movements: Extraocular movements intact.      Conjunctiva/sclera: Conjunctivae normal.      Pupils: Pupils are equal, round, and reactive to light.   Cardiovascular:      Rate and Rhythm: Normal rate and  "regular rhythm.      Pulses: Normal pulses.      Heart sounds: Normal heart sounds. No murmur heard.    No friction rub. No gallop.   Pulmonary:      Effort: Pulmonary effort is normal. No respiratory distress.      Breath sounds: Normal breath sounds. No stridor. No wheezing, rhonchi or rales.   Abdominal:      General: Abdomen is flat. Bowel sounds are normal. There is no distension.      Palpations: Abdomen is soft. There is no mass.      Tenderness: There is no abdominal tenderness. There is no left CVA tenderness, guarding or rebound.      Hernia: No hernia is present.   Musculoskeletal:         General: No swelling.      Cervical back: Normal range of motion and neck supple.      Right knee: Swelling present. Decreased range of motion. Tenderness present.        Legs:       Comments: Pain and tenderness,mild edema, no effusion. Xray shows degerative changes.    Lymphadenopathy:      Cervical: No cervical adenopathy.   Skin:     General: Skin is warm and dry.      Capillary Refill: Capillary refill takes less than 2 seconds.      Coloration: Skin is not jaundiced or pale.   Neurological:      General: No focal deficit present.      Mental Status: She is alert and oriented to person, place, and time.      Cranial Nerves: No cranial nerve deficit.      Sensory: No sensory deficit.      Gait: Gait normal.   Psychiatric:         Mood and Affect: Mood normal.         Behavior: Behavior normal.         Thought Content: Thought content normal.         Judgment: Judgment normal.         Vital Signs:  BP (!) 147/87 (BP Location: Left arm, Patient Position: Sitting, BP Method: Large (Manual))   Pulse 80   Temp 97.9 °F (36.6 °C) (Oral)   Resp 18   Ht 5' 6" (1.676 m)   Wt 110.2 kg (243 lb)   SpO2 97%   BMI 39.22 kg/m²   Body mass index is 39.22 kg/m².      Labs reviewed:  Lab Results   Component Value Date    WBC 6.93 04/25/2022    HGB 10.9 (L) 04/25/2022    HCT 35.0 (L) 04/25/2022     04/25/2022    CHOL 265 " (H) 11/18/2022    TRIG 163 (H) 11/18/2022    HDL 42 11/18/2022    ALT 30 11/18/2022    AST 15 11/18/2022     11/18/2022    K 4.2 11/18/2022     11/18/2022    CREATININE 0.92 11/18/2022    BUN 16 11/18/2022    CO2 27 11/18/2022    TSH 3.190 11/18/2022    HGBA1C 6.7 (H) 03/16/2023       Assessment:       1. Pain    2. Primary osteoarthritis of right knee        Plan:       Ibuprofen as directed  Will refer to Dr. Joseph Gonzalez     Recommendations:  Moist heat  2.salonpas to the affected area    Follow up if symptoms worsen or fail to improve.      Order summary:  Orders Placed This Encounter    X-Ray Knee 1 or 2 View Right    Ambulatory referral/consult to Orthopedics           KEITH Massey

## 2023-05-10 ENCOUNTER — OFFICE VISIT (OUTPATIENT)
Dept: ORTHOPEDICS | Facility: CLINIC | Age: 53
End: 2023-05-10
Payer: OTHER MISCELLANEOUS

## 2023-05-10 DIAGNOSIS — R52 PAIN: ICD-10-CM

## 2023-05-10 DIAGNOSIS — M23.91 INTERNAL DERANGEMENT OF RIGHT KNEE: ICD-10-CM

## 2023-05-10 PROCEDURE — 99204 PR OFFICE/OUTPT VISIT, NEW, LEVL IV, 45-59 MIN: ICD-10-PCS | Mod: S$PBB,,, | Performed by: ORTHOPAEDIC SURGERY

## 2023-05-10 PROCEDURE — 99204 OFFICE O/P NEW MOD 45 MIN: CPT | Mod: S$PBB,,, | Performed by: ORTHOPAEDIC SURGERY

## 2023-05-10 PROCEDURE — 99213 OFFICE O/P EST LOW 20 MIN: CPT | Mod: PBBFAC | Performed by: ORTHOPAEDIC SURGERY

## 2023-05-10 NOTE — PROGRESS NOTES
Clinic Note        CC:   Chief Complaint   Patient presents with    Right Knee - Pain, Follow-up        Principal problem: No primary diagnosis found.     REASON FOR VISIT:       HISTORY:  Patient is here for right knee pain she is 53-year-old female who sustained a fall at work little over week ago she is having pain and mechanical symptoms in the right knee she is having some locking of her x-rays show she has some degenerative changes right knee.      PAST MEDICAL HISTORY:   Past Medical History:   Diagnosis Date    Arthritis     Diabetes mellitus, type 2     Hypertension           PAST SURGICAL HISTORY:   Past Surgical History:   Procedure Laterality Date    ARTHROSCOPY OF KNEE Left 01/04/2022    Procedure: ARTHROSCOPY, KNEE;  Surgeon: Joseph Gonzalez MD;  Location: [facility];  Service: Orthopedics;  Laterality: Left;    EXCISION OF MEDIAL MENISCUS OF KNEE Left 01/04/2022    Procedure: MENISCECTOMY, KNEE, MEDIAL LATERAL;  Surgeon: Joseph Gonzalez MD;  Location: [facility];  Service: Orthopedics;  Laterality: Left;    HYSTERECTOMY      knee scope            ALLERGIES: Review of patient's allergies indicates:  No Known Allergies     MEDICATIONS :    Current Outpatient Medications:     amLODIPine (NORVASC) 5 MG tablet, Take 1 tablet (5 mg total) by mouth once daily., Disp: 90 tablet, Rfl: 1    atorvastatin (LIPITOR) 20 MG tablet, Take 1 tablet (20 mg total) by mouth once daily., Disp: 90 tablet, Rfl: 1    blood sugar diagnostic Strp, 1 each by Misc.(Non-Drug; Combo Route) route once daily., Disp: 200 strip, Rfl: 5    blood-glucose meter kit, 1 each by Other route once daily. Use as instructed, Disp: 1 each, Rfl: 0    fluticasone propionate (FLONASE) 50 mcg/actuation nasal spray, 1 spray (50 mcg total) by Each Nostril route once daily., Disp: 16 g, Rfl: 0    lancets 33 gauge Misc, 1 lancet by Misc.(Non-Drug; Combo Route) route Daily., Disp: 100 each, Rfl: 5     lisinopriL-hydrochlorothiazide (PRINZIDE,ZESTORETIC) 20-12.5 mg per tablet, Take 1 tablet by mouth once daily., Disp: 90 tablet, Rfl: 3    metFORMIN (GLUCOPHAGE) 500 MG tablet, Take 1 tablet (500 mg total) by mouth daily with breakfast., Disp: 90 tablet, Rfl: 1    ondansetron (ZOFRAN) 4 MG tablet, Take 1 tablet (4 mg total) by mouth every 8 (eight) hours as needed for Nausea., Disp: 30 tablet, Rfl: 1    semaglutide (OZEMPIC) 0.25 mg or 0.5 mg(2 mg/1.5 mL) pen injector, Inject 0.25 mg into the skin every 7 days., Disp: 1.5 each, Rfl: 6    triamcinolone acetonide 0.1% (KENALOG) 0.1 % ointment, Apply topically 2 (two) times daily., Disp: 15 g, Rfl: 0     SOCIAL HISTORY:   Social History     Socioeconomic History    Marital status: Single   Tobacco Use    Smoking status: Never     Passive exposure: Never    Smokeless tobacco: Never   Substance and Sexual Activity    Alcohol use: Never    Drug use: Never    Sexual activity: Yes     Partners: Male          FAMILY HISTORY:   Family History   Problem Relation Age of Onset    Diabetes Mother     Breast cancer Sister     Diabetes Sister     Hypertension Sister           PHYSICAL EXAM:  In general, this is a well-developed, well-nourished female . The patient is alert, oriented and cooperative.      HEENT:  Normocephalic, atraumatic.  Extraocular movements are intact bilaterally.  The oropharynx is benign.       NECK:  Nontender with good range of motion.      LUNGS:  Clear to auscultation bilaterally.      HEART:  Demonstrates a regular rate and rhythm.  No murmurs appreciated.      ABDOMEN:  Soft, non-tender, non-distended.        EXTREMITIES:  Right lower extremity she moves her toes she has sensation to touch has motor function 5/5 palpable pulses.  She has full motion of the knee she is tender over the lateral joint line pain on flexion past 90° no instability the knee is noted.  There is some mild crepitus on motion over the kneecap.      RADIOGRAPHIC FINDINGS:  X-rays  show some degenerative changes of the right knee both medially laterally      IMPRESSION: Pain  -     Ambulatory referral/consult to Orthopedics    Internal derangement of right knee         PLAN:  This time concerned about a lateral meniscus tear the patient had a fall she is now having mechanical symptoms tender over the lateral joint line we will get an MRI of her right knee she is taken anti-inflammatories.  Work restriction while she can do for pain she does not need to be doing a lot of standing she can do sedentary type work.  Follow back up after the MRI.  There are no Patient Instructions on file for this visit.      No follow-ups on file.         Joseph Gonzalez      (Subject to voice recognition error, transcription service not allowed)

## 2023-05-30 ENCOUNTER — TELEPHONE (OUTPATIENT)
Dept: ORTHOPEDICS | Facility: CLINIC | Age: 53
End: 2023-05-30
Payer: COMMERCIAL

## 2023-05-30 NOTE — TELEPHONE ENCOUNTER
Gave patient her MRI results. She will contact her  and let us know if we need to proceed with surgery or bring her back in to discuss before scheduling.

## 2023-05-30 NOTE — TELEPHONE ENCOUNTER
Left message. See doctor comments for recommended care.    ----- Message from Laisha Grigsby sent at 5/30/2023  9:00 AM CDT -----  Pt calling for mri results - call back # [phone]

## 2023-06-01 ENCOUNTER — TELEPHONE (OUTPATIENT)
Dept: ORTHOPEDICS | Facility: CLINIC | Age: 53
End: 2023-06-01
Payer: COMMERCIAL

## 2023-06-01 NOTE — TELEPHONE ENCOUNTER
----- Message from Joseph Gonzalez MD sent at 5/23/2023 11:43 AM CDT -----  Medial meniscus tear.  Patient can be offered a knee arthroscopy.  She also has some loose bodies.  Some degenerative changes.

## 2023-06-23 DIAGNOSIS — M25.562 BILATERAL KNEE PAIN: Primary | ICD-10-CM

## 2023-06-23 DIAGNOSIS — M25.561 BILATERAL KNEE PAIN: Primary | ICD-10-CM

## 2023-07-05 DIAGNOSIS — E11.9 TYPE 2 DIABETES MELLITUS WITHOUT COMPLICATION, WITHOUT LONG-TERM CURRENT USE OF INSULIN: Primary | ICD-10-CM

## 2023-07-05 RX ORDER — SEMAGLUTIDE 0.68 MG/ML
0.25 INJECTION, SOLUTION SUBCUTANEOUS
COMMUNITY
End: 2023-07-05

## 2023-07-07 RX ORDER — SEMAGLUTIDE 0.68 MG/ML
0.25 INJECTION, SOLUTION SUBCUTANEOUS
Qty: 1 EACH | Refills: 2 | Status: SHIPPED | OUTPATIENT
Start: 2023-07-07

## 2023-07-11 ENCOUNTER — CLINICAL SUPPORT (OUTPATIENT)
Dept: REHABILITATION | Facility: HOSPITAL | Age: 53
End: 2023-07-11
Attending: INTERNAL MEDICINE
Payer: COMMERCIAL

## 2023-07-11 DIAGNOSIS — M25.562 BILATERAL KNEE PAIN: ICD-10-CM

## 2023-07-11 DIAGNOSIS — M25.561 BILATERAL KNEE PAIN: ICD-10-CM

## 2023-07-11 DIAGNOSIS — S83.241A ACUTE MEDIAL MENISCUS TEAR, RIGHT, INITIAL ENCOUNTER: ICD-10-CM

## 2023-07-11 PROCEDURE — 97110 THERAPEUTIC EXERCISES: CPT

## 2023-07-11 PROCEDURE — 97163 PT EVAL HIGH COMPLEX 45 MIN: CPT

## 2023-07-11 NOTE — PLAN OF CARE
[facility]   Physical Therapy Initial Evaluation      Name: Samara Liz  Clinic Number: [id number]    Therapy Diagnosis: Right meniscus tear   Physician: Wen Pang DO    Physician Orders: PT Eval and Treat Knees  Medical Diagnosis from Referral: Right knee pain with meniscus tear   Evaluation Date: 7/11/2023  Authorization Period Expiration: 6/22/2023  Plan of Care Expiration: 9/5/2023  Progress Note Due: As needed  Visit # / Visits authorized: 1/ 16   FOTO: 64/100    Precautions: Diabetes     Time In: 4:10 pm  Time Out: 5:00 pm  Total Appointment Time (timed & untimed codes): 50 minutes    Subjective     Date of onset: 5/2/2023    History of current condition - Samara reports: Patient was stepping off a lift platform at work and twisting right knee with sharp pain.    MD note states:  HISTORY:  Patient is here for right knee pain she is 53-year-old female who sustained a fall at work little over week ago she is having pain and mechanical symptoms in the right knee she is having some locking of her x-rays show she has some degenerative changes right knee.    Falls: fall at work    Imaging:   MRI KNEE WITHOUT CONTRAST RIGHT     CLINICAL HISTORY:  Unspecified internal derangement of right kneeKnee pain, chronic, degenerative disease on xray (Age >= 5y);     COMPARISON:  Right knee x-ray May 3, 2023     TECHNIQUE:  Magnetic resonance imaging of the right knee was performed without the use of intravenous contrast.     FINDINGS:  Medial meniscus: Extensive complex tearing/maceration involving posterior horn and posterior root and to a lesser degree posterior body of the medial meniscus.     Lateral meniscus: The lateral meniscus is grossly intact.     Anterior cruciate ligament: The anterior cruciate ligament is grossly intact.     Posterior cruciate ligament: The posterior cruciate ligament is grossly intact.     Medial collateral ligament: The medial collateral ligament  is grossly intact.     Lateral collateral ligament: The lateral collateral ligament is grossly  intact.     Popliteus tendon: The popliteus tendon is grossly  intact.     Extensor mechanism: The patellar tendon and the visualized portion of the quadriceps tendon are grossly intact.     Osseous structures and cartilage: High-grade tricompartmental cartilage loss with moderate marginal osteophyte formation.  A few intra-articular loose bodies are suggested medially within the suprapatellar region.  Representative measures up the 9 mm in axial dimension.     Joint fluid: There is no significant knee joint effusion.  There is no significant Baker's cyst.     Impression:     High-grade tricompartmental cartilage loss with moderate marginal osteophyte formation.A few intra-articular loose bodies are suggested medially within the suprapatellar region. Representative measures up the 9 mm in axial dimension.     Extensive complex tearing/maceration involving posterior horn and posterior root and to a lesser degree posterior body of the medial meniscus.    Prior Therapy: N/A  Social History:  lives alone  Occupation: [employer]  Prior Level of Function: Prior to injury at work, patient was working full-time with no pain or limitations  Current Level of Function: Patient had to return to work with constant pain with no work limitations. Patient exhibits difficulty coming to stand from chair with antalgic gait. Patient states that knee feels like it is going to give out all the time.    Pain:  Current 8/10, worst 9/10, best 5/10   Location: right knee    Description: Throbbing and Grabbing  Aggravating Factors: Standing, Bending, Walking, and Getting out of bed/chair  Easing Factors: ice, rest, and elevation    Patients goals: To decrease pain and improve mobility     Medical History:   Past Medical History:   Diagnosis Date    Arthritis     Diabetes mellitus, type 2     Hypertension        Surgical History:   Samara  Licha Liz  has a past surgical history that includes Hysterectomy; knee scope; Arthroscopy of knee (Left, 01/04/2022); and Excision of medial meniscus of knee (Left, 01/04/2022).    Medications:   Samara has a current medication list which includes the following prescription(s): amlodipine, atorvastatin, blood sugar diagnostic, blood-glucose meter, fluticasone propionate, lancets, lisinopril-hydrochlorothiazide, metformin, ondansetron, ozempic, and triamcinolone acetonide 0.1%.    Allergies:   Review of patient's allergies indicates:  No Known Allergies     Objective          Observation : Edema present right knee with instability noted during ambulation.    Pronation: Right = significant                     Left = significant     Incision :  N/A        Girth Measurements :      Right Lower Extremity :  Mid Patella 47.5  cm         Left  Lower Extremity :  Mid Patella 45.9  cm     Comments : Patient exhibits difficulty coming to stand from chair with antalgic gait.        Range of Motion/Strength :                  Left Extremity                                                                        Right Extremity   AROM PROM Strength  Location  AROM    PROM   Strength     5/5   Hip      Flexion (140)   4+/5                    Extension (10)                       Internal Rotation (40)                       External Rotation (50)                       Abduction (45)                       Adduction (30)      120 130 5/5   Knee    Flexion (140) 105 110 4+/5   0 0 5/5                Extension (0) 0 0 4/5        Ankle   Dorsiflexion (20)                        Plantar Flexion (50)                        Inversion (35)                        Eversion (25)                 Knee Special Tests :     B. Knee  Lochman's test: right Negative left Negative  Anterior drawer: right Negative left Negative  Posterior drawer: right Negative left Negative  Varus stress test: right Negative left Negative  Valgus stress test:  right Negative left Negative  PFJ grind test: right Positive left Positive  McMurrays: right Positive left Negative  8.  Thessaly's test : Positive    Functional Impairments : Edema present right knee with instability noted during ambulation. Patient exhibits difficulty coming to stand from chair with antalgic gait.         Limitation/Restriction for FOTO Knee Survey    Therapist reviewed FOTO scores for Samara Liz on 7/11/2023.   FOTO documents entered into [employer] - see Media section.    Limitation Score: 36%         Treatment     Total Treatment time (time-based codes) separate from Evaluation: 10 minutes       Samara received the treatments listed below:  THERAPEUTIC EXERCISES to develop strength, ROM, and flexibility for 10 minutes including QUAD SET, heel slides, STRAIGHT LEG RAISE, SHORT ARC QUAD, seated marches, LONG ARC QUAD and mini squats.      Patient Education and Home Exercises     Education provided:   - PLAN OF CARE  - HOME EXERCISE PROGRAM     Written Home Exercises Provided: yes. Exercises were reviewed and Samara was able to demonstrate them prior to the end of the session.  Samara demonstrated good  understanding of the education provided. See EMR under Patient Instructions for exercises provided during therapy sessions.    Assessment     Samara is a 53 y.o. female referred to outpatient Physical Therapy with a medical diagnosis of Bilateral knee pain. Patient presents with right posterior horn and posterior root  medial meniscus tear. Increased edema present, decreased strength, decreased knee flexion, antalgic gait with instability present and positive test for medial meniscus tear. Patient is currently a fall risk especially with stairs due to right knee instability. Feel that patient needs surgical intervention. Will work to decrease pain, edema and restore motion and strength.     Patient prognosis is Guarded.   Patient will benefit from skilled outpatient Physical Therapy to address  the deficits stated above and in the chart below, provide patient /family education, and to maximize patientt's level of independence.     Plan of care discussed with patient: Yes  Patient's spiritual, cultural and educational needs considered and patient is agreeable to the plan of care and goals as stated below:     Anticipated Barriers for therapy: MRI indicates need for surgical intervention    Medical Necessity is demonstrated by the following  History  Co-morbidities and personal factors that may impact the plan of care [] LOW: no personal factors / co-morbidities  [] MODERATE: 1-2 personal factors / co-morbidities  [x] HIGH: 3+ personal factors / co-morbidities    Moderate / High Support Documentation:   Co-morbidities affecting plan of care:    Arthritis    Diabetes mellitus, type 2    Hypertension   Hysterectomy; knee scope; Arthroscopy of knee (Left, 01/04/2022); and Excision of medial meniscus of knee (Left, 01/04/2022).    Personal Factors:   no deficits     Examination  Body Structures and Functions, activity limitations and participation restrictions that may impact the plan of care [] LOW: addressing 1-2 elements  [] MODERATE: 3+ elements  [x] HIGH: 4+ elements (please support below)    Moderate / High Support Documentation: right knee pain, decreased motion, strength, increased edema, abnormal gait     Clinical Presentation [] LOW: stable  [] MODERATE: Evolving  [x] HIGH: Unstable     Decision Making/ Complexity Score: high       Goals:  Short Term Goals: 4 weeks   1. Independent with Home Exercise Program   2. Increase Right Knee Range of Motion to 0 Degrees to 120 Degrees  3. Increase Right Knee Strength to grossly 4+/5  4. Patient will ambulate 500 feet with Normal Gait pattern with complaints of pain Less than or Equal to 5/10.      Long Term Goals: 8 weeks   1. Patient will increase Right Knee Strength to grossly 5/5  2. Patient will ambulate 1000+ feet  with stable knee and complaints of pain  Less than or Equal to 2/10.   3. Patient will exhibit no edema in right knee     Plan     Plan of care Certification: 7/11/2023 to 9/5/2023.    Outpatient Physical Therapy 2 times weekly for 8 weeks to include the following interventions: Electrical Stimulation Game, Manual Therapy, Moist Heat/ Ice, Neuromuscular Re-ed, Patient Education, Therapeutic Activities, Therapeutic Exercise, and Game Ready.     PABLO RODRIGUEZ, PT, MLT    7/11/2023

## 2023-07-11 NOTE — PROGRESS NOTES
"See PLAN OF CARE     Sup Visit performed today with ARA Erwin and ARA Santamaria.  All goals and treatment plan reviewed. Will work toward completion of all goals set.     Plans for first treatment:      Knee Exercises      Bike/Nustep    Calf Stretch    Hamstring Stretch    Quad Stretch    Cybex Leg Press - Bilateral    Cybex Leg Press - Single     Cybex Knee Extension    Cybex Hamstring Curls    Cybex Hip - Abduction    Cybex Hip - Flexion     Cybex Hip - Extension                                                               Neuro-re-ed x     Cable TKE's  Lateral step downs 2"  STRAIGHT LEG RAISE   SINGLE LEG STANCE     Therapeutic activity x     Forward step ups 4"  Squats at rail  Heel raises  Ball squats    Modalities x     E-stim   Game Ready    "

## 2023-07-27 ENCOUNTER — CLINICAL SUPPORT (OUTPATIENT)
Dept: REHABILITATION | Facility: HOSPITAL | Age: 53
End: 2023-07-27
Attending: INTERNAL MEDICINE
Payer: COMMERCIAL

## 2023-07-27 DIAGNOSIS — S83.241A ACUTE MEDIAL MENISCUS TEAR, RIGHT, INITIAL ENCOUNTER: Primary | ICD-10-CM

## 2023-07-27 PROCEDURE — 97112 NEUROMUSCULAR REEDUCATION: CPT | Mod: CQ

## 2023-07-27 PROCEDURE — 97110 THERAPEUTIC EXERCISES: CPT | Mod: CQ

## 2023-07-27 NOTE — PROGRESS NOTES
"  Physical Therapy Treatment Note     Name: Samara Pateln  Clinic Number: [id number]    Therapy Diagnosis:   Encounter Diagnosis   Name Primary?    Acute medial meniscus tear, right, initial encounter Yes     Physician: Wen Pang DO    Visit Date: 7/27/2023    Physician Orders: PT Eval and Treat Knees  Medical Diagnosis from Referral: Right knee pain with meniscus tear   Evaluation Date: 7/11/2023  Authorization Period Expiration: 6/22/2023  Plan of Care Expiration: 9/5/2023  Progress Note Due: As needed  Visit # / Visits authorized: 2/ 16   FOTO: 64/100  PTA Visit #: 1    Time In: 1645  Time Out: 1635  Total Billable Time: 43 minutes +7 minutes ice pack    Precautions: Standard    Received Plan of Care per Orlando Lancaster PT     Subjective     Pt reports: pain in bilateral knees with right>left; has taken pain meds today; uses ice packs prn for pain management  She was compliant with home exercise program.  Response to previous treatment: "I could feel it"   Functional change: ongoing    Pain: 7/10  Location: bilateral knees      Objective       Samara received therapeutic exercises to develop strength, ROM, and flexibility for 35 minutes including:  Bike/Nustep 5 minutes    Calf Stretch 2 minutes    Hamstring Stretch 3x20 second hold each, bilateral    Quad Stretch 3x20 second hold each, bilateral    Cybex Leg Press - Bilateral 6 plates x 20 repetitions    Cybex Leg Press - Single     Cybex Knee Extension 2 plates x 20 repetitions    Cybex Hamstring Curls 4 plates x 20 repetitions    Cybex Hip - Abduction 2 plates x 10 repetitions    Cybex Hip - Flexion  2 plates x 10 repetitions    Cybex Hip - Extension 2 plates x 10 repetitions    Cybex bilateral calf press 6 plates x 20 repetitions                                                             Samara received the following manual therapy techniques:  were applied to the: right knee for 0 minutes, including:    Samara participated in neuromuscular " "re-education activities to improve: Balance and Proprioception for 8 minutes. The following activities were included:  Cable terminal knee extension (not today)  Lateral step downs 2" (not today)  STRAIGHT LEG RAISE x 30 repetitions   SINGLE LEG STANCE x 2 minutes     Samara participated in dynamic functional therapeutic activities to improve functional performance for 0  minutes, including:  Forward step ups 4"  Squats at rail  Heel raises  Ball squats    Samara participated in gait training to improve functional mobility and safety for 0  minutes, including:      Samara received the following direct contact modalities after being cleared for contraindications:     Samara received the following supervised modalities after being cleared for contradictions:     Samara received cold pack for 7 minutes to right knee.      Home Exercises Provided and Patient Education Provided     Education provided: continue current home exercise program, pain free; pain management     Written Home Exercises Provided: Patient instructed to cont prior HEP.  Exercises were reviewed and Samara was able to demonstrate them prior to the end of the session.  Samara demonstrated good  understanding of the education provided.     See EMR under Patient Instructions for exercises provided  7/11/2023 .    Assessment     Pt did well with treatment today reporting feeling better with a decrease in pain to 4-5/10; treatment appropriate for goals   Samara Is progressing well towards her goals.   Pt prognosis is Good.     Pt will continue to benefit from skilled outpatient physical therapy to address the deficits listed in the problem list box on initial evaluation, provide pt/family education and to maximize pt's level of independence in the home and community environment.      Anticipated barriers to physical therapy: home exercise program compliance     Goals:  Short Term Goals: 4 weeks   1. Independent with Home Exercise Program   2. Increase " Right Knee Range of Motion to 0 Degrees to 120 Degrees  3. Increase Right Knee Strength to grossly 4+/5  4. Patient will ambulate 500 feet with Normal Gait pattern with complaints of pain Less than or Equal to 5/10.       Long Term Goals: 8 weeks   1. Patient will increase Right Knee Strength to grossly 5/5  2. Patient will ambulate 1000+ feet  with stable knee and complaints of pain Less than or Equal to 2/10.   3. Patient will exhibit no edema in right knee    Plan     Plan of care Certification: 7/11/2023 to 9/5/2023.     Outpatient Physical Therapy 2 times weekly for 8 weeks to include the following interventions: Electrical Stimulation Game, Manual Therapy, Moist Heat/ Ice, Neuromuscular Re-ed, Patient Education, Therapeutic Activities, Therapeutic Exercise, and Game Ready.     Continue per Plan of Care and progress as pt able   Samanta Walden, PTA  7/27/2023

## 2023-08-01 ENCOUNTER — CLINICAL SUPPORT (OUTPATIENT)
Dept: REHABILITATION | Facility: HOSPITAL | Age: 53
End: 2023-08-01
Attending: INTERNAL MEDICINE
Payer: COMMERCIAL

## 2023-08-01 DIAGNOSIS — S83.241A ACUTE MEDIAL MENISCUS TEAR, RIGHT, INITIAL ENCOUNTER: Primary | ICD-10-CM

## 2023-08-01 PROCEDURE — 97110 THERAPEUTIC EXERCISES: CPT | Mod: CQ

## 2023-08-01 PROCEDURE — 97016 VASOPNEUMATIC DEVICE THERAPY: CPT | Mod: CQ

## 2023-08-01 PROCEDURE — 97112 NEUROMUSCULAR REEDUCATION: CPT | Mod: CQ

## 2023-08-01 NOTE — PROGRESS NOTES
"  Physical Therapy Treatment Note     Name: Samara Pateln  Clinic Number: [id number]    Therapy Diagnosis:   No diagnosis found.    Physician: Wen Pang DO    Visit Date: 8/1/2023    Physician Orders: PT Eval and Treat Knees  Medical Diagnosis from Referral: Right knee pain with meniscus tear   Evaluation Date: 7/11/2023  Authorization Period Expiration: 6/22/2023  Plan of Care Expiration: 9/5/2023  Progress Note Due: As needed  Visit # / Visits authorized: 3 / 16   FOTO: 64/100  PTA Visit #: 2/5    Time In: 4:48 pm  Time Out: 5:38 pm  Total Billable Time: 50 minutes    Precautions: Standard      Subjective     Pt reports: no change in knee pain; more swollen today  She was compliant with home exercise program.  Response to previous treatment: "I could feel it"   Functional change: ongoing    Pain: 7/10  Location: bilateral knees      Objective       Samara received therapeutic exercises to develop strength, ROM, and flexibility for 23 minutes including:  Bike/Nustep 5 minutes    Calf Stretch 4x20 seconds   Hamstring Stretch 3x20 second hold each, bilateral    Quad Stretch 3x20 second hold each, bilateral    Cybex Leg Press - Bilateral 6 plates x 20 repetitions    Cybex Leg Press - Single  3 plates, 20x   Cybex Hamstring Curls 4 plates x 30 repetitions            Bridges  30x   Calf Raises  20x                                            Samara received the following manual therapy techniques:  were applied to the: right knee for 0 minutes, including:    Samara participated in neuromuscular re-education activities to improve: Balance and Proprioception for 10 minutes. The following activities were included:  Cable terminal knee extension (not today)  Lateral step downs 2" (not today)  STRAIGHT LEG RAISE x 30 repetitions   Prone TERMINAL KNEE EXTENSION 10x10 seconds    Samara participated in dynamic functional therapeutic activities to improve functional performance for 0  minutes, including:  Forward " "step ups 4"  Squats at rail  Heel raises  Ball squats    Samara participated in gait training to improve functional mobility and safety for 0  minutes, including:      Samara received the following direct contact modalities after being cleared for contraindications:     Samara received the following supervised modalities after being cleared for contradictions:       GameReady x 15 minutes, end of session, low compression.      Home Exercises Provided and Patient Education Provided     Education provided: continue current home exercise program, pain free; pain management     Written Home Exercises Provided: Patient instructed to cont prior HEP.  Exercises were reviewed and Samara was able to demonstrate them prior to the end of the session.  Samara demonstrated good  understanding of the education provided.     See EMR under Patient Instructions for exercises provided  7/11/2023 .    Assessment     Pt tolerated today's session well with some pain noted in R knee. Some swelling along medial joint line. Able to progress LOWER EXTREMITY strengthening with mostly fatigue noted. Pt's mobility in R knee is doing well. Ended session with GameReady to help with edema/pain in R LOWER EXTREMITY. Will continue to progress as able.     Samara Is progressing well towards her goals.   Pt prognosis is Good.     Pt will continue to benefit from skilled outpatient physical therapy to address the deficits listed in the problem list box on initial evaluation, provide pt/family education and to maximize pt's level of independence in the home and community environment.      Anticipated barriers to physical therapy: home exercise program compliance     Goals:  Short Term Goals: 4 weeks   1. Independent with Home Exercise Program   2. Increase Right Knee Range of Motion to 0 Degrees to 120 Degrees  3. Increase Right Knee Strength to grossly 4+/5  4. Patient will ambulate 500 feet with Normal Gait pattern with complaints of pain Less than " or Equal to 5/10.       Long Term Goals: 8 weeks   1. Patient will increase Right Knee Strength to grossly 5/5  2. Patient will ambulate 1000+ feet  with stable knee and complaints of pain Less than or Equal to 2/10.   3. Patient will exhibit no edema in right knee    Plan     Plan of care Certification: 7/11/2023 to 9/5/2023.     Outpatient Physical Therapy 2 times weekly for 8 weeks to include the following interventions: Electrical Stimulation Game, Manual Therapy, Moist Heat/ Ice, Neuromuscular Re-ed, Patient Education, Therapeutic Activities, Therapeutic Exercise, and Game Ready.     Continue per Plan of Care and progress as pt able   Lul Chua, PTA  8/1/2023

## 2023-08-28 ENCOUNTER — OFFICE VISIT (OUTPATIENT)
Dept: ORTHOPEDICS | Facility: CLINIC | Age: 53
End: 2023-08-28
Payer: COMMERCIAL

## 2023-08-28 DIAGNOSIS — M23.91 INTERNAL DERANGEMENT OF RIGHT KNEE: Primary | ICD-10-CM

## 2023-08-28 DIAGNOSIS — M23.92 INTERNAL DERANGEMENT OF LEFT KNEE: ICD-10-CM

## 2023-08-28 PROCEDURE — 4010F PR ACE/ARB THEARPY RXD/TAKEN: ICD-10-PCS | Mod: CPTII,,, | Performed by: ORTHOPAEDIC SURGERY

## 2023-08-28 PROCEDURE — 1159F PR MEDICATION LIST DOCUMENTED IN MEDICAL RECORD: ICD-10-PCS | Mod: CPTII,,, | Performed by: ORTHOPAEDIC SURGERY

## 2023-08-28 PROCEDURE — 99214 OFFICE O/P EST MOD 30 MIN: CPT | Mod: S$PBB,,, | Performed by: ORTHOPAEDIC SURGERY

## 2023-08-28 PROCEDURE — 1159F MED LIST DOCD IN RCRD: CPT | Mod: CPTII,,, | Performed by: ORTHOPAEDIC SURGERY

## 2023-08-28 PROCEDURE — 99213 OFFICE O/P EST LOW 20 MIN: CPT | Mod: PBBFAC | Performed by: ORTHOPAEDIC SURGERY

## 2023-08-28 PROCEDURE — 3044F PR MOST RECENT HEMOGLOBIN A1C LEVEL <7.0%: ICD-10-PCS | Mod: CPTII,,, | Performed by: ORTHOPAEDIC SURGERY

## 2023-08-28 PROCEDURE — 3044F HG A1C LEVEL LT 7.0%: CPT | Mod: CPTII,,, | Performed by: ORTHOPAEDIC SURGERY

## 2023-08-28 PROCEDURE — 4010F ACE/ARB THERAPY RXD/TAKEN: CPT | Mod: CPTII,,, | Performed by: ORTHOPAEDIC SURGERY

## 2023-08-28 PROCEDURE — 99214 PR OFFICE/OUTPT VISIT, EST, LEVL IV, 30-39 MIN: ICD-10-PCS | Mod: S$PBB,,, | Performed by: ORTHOPAEDIC SURGERY

## 2023-08-28 NOTE — PROGRESS NOTES
Patient is here for follow-up of her right knee she is actually having some pain over the lateral aspect of her left knee she had a previous arthroscopy.  She had a fall in May of 2023 where she had a twisting injury to her right knee.  She is had the MRI that shows she has a medial meniscus tear on the right with some possible loose bodies.  She is had mechanical symptoms.  Pain on Lita's testing.  Taken anti-inflammatories.  Not getting relief the symptoms despite doing home therapy.  At this time we discussed surgical intervention with the patient.  She wished to proceed with arthroscopy of the right knee.  Will set this up in the near future.  We will do the surgery once she is been approved.  Risks and benefits surgery were discussed patient understands risks benefits wished to proceed with surgery.

## 2023-09-07 PROBLEM — S83.241A ACUTE MEDIAL MENISCUS TEAR, RIGHT, INITIAL ENCOUNTER: Status: RESOLVED | Noted: 2023-07-11 | Resolved: 2023-09-07

## 2023-09-07 NOTE — PLAN OF CARE
"  Physical Therapy Treatment Note/Discharge Summary     Name: Samara Liz  Clinic Number: [id number]    Therapy Diagnosis:   No diagnosis found.    Physician: Wen Pang DO    Visit Date: 8/1/2023    Physician Orders: PT Eval and Treat Knees  Medical Diagnosis from Referral: Right knee pain with meniscus tear   Evaluation Date: 7/11/2023  Authorization Period Expiration: 6/22/2023  Plan of Care Expiration: 9/5/2023  Progress Note Due: As needed  Visit # / Visits authorized: 3 / 16   FOTO: 64/100  PTA Visit #: 2/5    Time In: 4:48 pm  Time Out: 5:38 pm  Total Billable Time: 50 minutes    Precautions: Standard      Subjective     Pt reports: no change in knee pain; more swollen today  She was compliant with home exercise program.  Response to previous treatment: "I could feel it"   Functional change: ongoing    Pain: 7/10  Location: bilateral knees      Objective       Samara received therapeutic exercises to develop strength, ROM, and flexibility for 23 minutes including:  Bike/Nustep 5 minutes    Calf Stretch 4x20 seconds   Hamstring Stretch 3x20 second hold each, bilateral    Quad Stretch 3x20 second hold each, bilateral    Cybex Leg Press - Bilateral 6 plates x 20 repetitions    Cybex Leg Press - Single  3 plates, 20x   Cybex Hamstring Curls 4 plates x 30 repetitions            Bridges  30x   Calf Raises  20x                                            Samara received the following manual therapy techniques:  were applied to the: right knee for 0 minutes, including:    Samara participated in neuromuscular re-education activities to improve: Balance and Proprioception for 10 minutes. The following activities were included:  Cable terminal knee extension (not today)  Lateral step downs 2" (not today)  STRAIGHT LEG RAISE x 30 repetitions   Prone TERMINAL KNEE EXTENSION 10x10 seconds    Samara participated in dynamic functional therapeutic activities to improve functional performance for 0  minutes, " "including:  Forward step ups 4"  Squats at rail  Heel raises  Ball squats    Samara participated in gait training to improve functional mobility and safety for 0  minutes, including:      Samara received the following direct contact modalities after being cleared for contraindications:     Samara received the following supervised modalities after being cleared for contradictions:       GameReady x 15 minutes, end of session, low compression.      Home Exercises Provided and Patient Education Provided     Education provided: continue current home exercise program, pain free; pain management     Written Home Exercises Provided: Patient instructed to cont prior HEP.  Exercises were reviewed and Samara was able to demonstrate them prior to the end of the session.  Samara demonstrated good  understanding of the education provided.     See EMR under Patient Instructions for exercises provided 7/11/2023.    Assessment     Pt tolerated today's session well with some pain noted in R knee. Some swelling along medial joint line. Able to progress LOWER EXTREMITY strengthening with mostly fatigue noted. Pt's mobility in R knee is doing well. Ended session with GameReady to help with edema/pain in R LOWER EXTREMITY. Will continue to progress as able.     Samara Is progressing well towards her goals.   Pt prognosis is Good.     Pt will continue to benefit from skilled outpatient physical therapy to address the deficits listed in the problem list box on initial evaluation, provide pt/family education and to maximize pt's level of independence in the home and community environment.      Anticipated barriers to physical therapy: home exercise program compliance     Goals:  Short Term Goals: 4 weeks   1. Independent with Home Exercise Program   2. Increase Right Knee Range of Motion to 0 Degrees to 120 Degrees  3. Increase Right Knee Strength to grossly 4+/5  4. Patient will ambulate 500 feet with Normal Gait pattern with complaints " of pain Less than or Equal to 5/10.       Long Term Goals: 8 weeks   1. Patient will increase Right Knee Strength to grossly 5/5  2. Patient will ambulate 1000+ feet  with stable knee and complaints of pain Less than or Equal to 2/10.   3. Patient will exhibit no edema in right knee    Plan     Plan of care Certification: 7/11/2023 to 9/5/2023.     Outpatient Physical Therapy 2 times weekly for 8 weeks to include the following interventions: Electrical Stimulation Game, Manual Therapy, Moist Heat/ Ice, Neuromuscular Re-ed, Patient Education, Therapeutic Activities, Therapeutic Exercise, and Game Ready.     Continue per Plan of Care and progress as pt suellen Chua, DELORES  8/1/2023    Patient did not return to Therapy following this treatment on 8/1/2023. Therefore patient is discharged from Physical Therapy services at current level of function as listed above.    Orlando Lancaster, PT, MLT

## 2024-01-08 ENCOUNTER — HOSPITAL ENCOUNTER (OUTPATIENT)
Dept: RADIOLOGY | Facility: HOSPITAL | Age: 54
Discharge: HOME | End: 2024-01-08
Attending: NURSE PRACTITIONER
Payer: COMMERCIAL

## 2024-01-08 ENCOUNTER — OFFICE VISIT (OUTPATIENT)
Dept: PRIMARY CARE CLINIC | Facility: CLINIC | Age: 54
End: 2024-01-08
Payer: OTHER MISCELLANEOUS

## 2024-01-08 VITALS
HEART RATE: 72 BPM | RESPIRATION RATE: 20 BRPM | HEIGHT: 66 IN | WEIGHT: 243 LBS | BODY MASS INDEX: 39.05 KG/M2 | OXYGEN SATURATION: 97 % | SYSTOLIC BLOOD PRESSURE: 142 MMHG | DIASTOLIC BLOOD PRESSURE: 85 MMHG | TEMPERATURE: 98 F

## 2024-01-08 DIAGNOSIS — S29.019A THORACIC MYOFASCIAL STRAIN, INITIAL ENCOUNTER: ICD-10-CM

## 2024-01-08 DIAGNOSIS — S29.019A THORACIC MYOFASCIAL STRAIN, INITIAL ENCOUNTER: Primary | ICD-10-CM

## 2024-01-08 PROCEDURE — 96372 THER/PROPH/DIAG INJ SC/IM: CPT | Mod: ,,, | Performed by: NURSE PRACTITIONER

## 2024-01-08 PROCEDURE — 72070 X-RAY EXAM THORAC SPINE 2VWS: CPT | Mod: 26,,, | Performed by: STUDENT IN AN ORGANIZED HEALTH CARE EDUCATION/TRAINING PROGRAM

## 2024-01-08 PROCEDURE — 72070 X-RAY EXAM THORAC SPINE 2VWS: CPT | Mod: TC

## 2024-01-08 PROCEDURE — 99213 OFFICE O/P EST LOW 20 MIN: CPT | Mod: 25,,, | Performed by: NURSE PRACTITIONER

## 2024-01-08 RX ORDER — IBUPROFEN 800 MG/1
800 TABLET ORAL 3 TIMES DAILY
Qty: 30 TABLET | Refills: 0 | Status: SHIPPED | OUTPATIENT
Start: 2024-01-08 | End: 2024-02-20

## 2024-01-08 RX ORDER — KETOROLAC TROMETHAMINE 30 MG/ML
60 INJECTION, SOLUTION INTRAMUSCULAR; INTRAVENOUS
Status: COMPLETED | OUTPATIENT
Start: 2024-01-08 | End: 2024-01-08

## 2024-01-08 RX ORDER — METHOCARBAMOL 500 MG/1
500 TABLET, FILM COATED ORAL 4 TIMES DAILY
Qty: 30 TABLET | Refills: 0 | Status: SHIPPED | OUTPATIENT
Start: 2024-01-08

## 2024-01-08 RX ADMIN — KETOROLAC TROMETHAMINE 30 MG: 30 INJECTION, SOLUTION INTRAMUSCULAR; INTRAVENOUS at 08:01

## 2024-01-08 NOTE — PROGRESS NOTES
Subjective     Patient ID: Samara Liz is a 53 y.o. female.    Chief Complaint: Work Related Injury (Patientt presents here today with work related injury to back. )    52 y/o bf presents for mid back pain that started several months ago and has not improved. 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       Review of Systems   Gastrointestinal:  Negative for fecal incontinence.   Genitourinary:  Negative for bladder incontinence.   Musculoskeletal:  Positive for back pain. Negative for joint swelling and myalgias.   Neurological:  Negative for weakness and numbness.   All other systems reviewed and are negative.         Objective     Physical Exam  Vitals and nursing note reviewed.   Constitutional:       Appearance: Normal appearance.   HENT:      Head: Normocephalic.   Eyes:      Pupils: Pupils are equal, round, and reactive to light.   Cardiovascular:      Rate and Rhythm: Normal rate and regular rhythm.      Heart sounds: Normal heart sounds.   Pulmonary:      Effort: Pulmonary effort is normal.      Breath sounds: Normal breath sounds.   Musculoskeletal:         General: Tenderness present. No swelling or deformity. Normal range of motion.      Cervical back: Normal range of motion.   Skin:     General: Skin is warm and dry.   Neurological:      General: No focal deficit present.      Mental Status: She is alert and oriented to person, place, and time.   Psychiatric:         Attention and Perception: Attention and perception normal.         Mood and Affect: Mood normal.         Speech: Speech normal.         Behavior: Behavior normal. Behavior is cooperative.         Cognition and Memory: Cognition normal.         Judgment: Judgment normal.            Assessment and Plan     1. Thoracic myofascial strain, initial encounter  -     X-Ray Thoracic Spine AP Lateral; Future; Expected date: 01/08/2024    Other orders  -     ketorolac injection 60  mg  -     methocarbamoL (ROBAXIN) 500 MG Tab; Take 1 tablet (500 mg total) by mouth 4 (four) times daily.  Dispense: 30 tablet; Refill: 0  -     ibuprofen (ADVIL,MOTRIN) 800 MG tablet; Take 1 tablet (800 mg total) by mouth 3 (three) times daily.  Dispense: 30 tablet; Refill: 0        RTW with modified lifting. No lifting over 10-15 lbs. No climbing bending or stooping. RTN to [facility] 1/16/24 for follow up . Take meds as prescribed.          No follow-ups on file.

## 2024-01-17 ENCOUNTER — OFFICE VISIT (OUTPATIENT)
Dept: PRIMARY CARE CLINIC | Facility: CLINIC | Age: 54
End: 2024-01-17
Payer: OTHER MISCELLANEOUS

## 2024-01-17 VITALS
SYSTOLIC BLOOD PRESSURE: 177 MMHG | OXYGEN SATURATION: 97 % | HEIGHT: 66 IN | RESPIRATION RATE: 20 BRPM | TEMPERATURE: 98 F | DIASTOLIC BLOOD PRESSURE: 98 MMHG | WEIGHT: 243 LBS | HEART RATE: 80 BPM | BODY MASS INDEX: 39.05 KG/M2

## 2024-01-17 DIAGNOSIS — S29.019D THORACIC MYOFASCIAL STRAIN, SUBSEQUENT ENCOUNTER: Primary | ICD-10-CM

## 2024-01-17 PROCEDURE — 99213 OFFICE O/P EST LOW 20 MIN: CPT | Mod: ,,, | Performed by: NURSE PRACTITIONER

## 2024-01-17 NOTE — PROGRESS NOTES
Subjective     Patient ID: Samara Liz is a 53 y.o. female.    Chief Complaint: Work Related Injury (Patientt presents here today with work related injury to back. )    52 y/o bf presents for follow up mid back strain, thoracic area. She states the pain is much better with only minimal pain.       Review of Systems   Musculoskeletal:  Positive for back pain.   All other systems reviewed and are negative.         Objective     Physical Exam  Vitals and nursing note reviewed.   Constitutional:       Appearance: Normal appearance.   HENT:      Head: Normocephalic.   Eyes:      Pupils: Pupils are equal, round, and reactive to light.   Cardiovascular:      Rate and Rhythm: Normal rate and regular rhythm.      Heart sounds: Normal heart sounds.   Pulmonary:      Effort: Pulmonary effort is normal.      Breath sounds: Normal breath sounds.   Musculoskeletal:         General: No swelling or tenderness. Normal range of motion.      Cervical back: Normal range of motion.   Skin:     General: Skin is warm and dry.   Neurological:      General: No focal deficit present.      Mental Status: She is alert and oriented to person, place, and time.   Psychiatric:         Attention and Perception: Attention and perception normal.         Mood and Affect: Mood normal.         Speech: Speech normal.         Behavior: Behavior normal. Behavior is cooperative.         Cognition and Memory: Cognition normal.         Judgment: Judgment normal.            Assessment and Plan     1. Thoracic myofascial strain, subsequent encounter        RTW full duty. RTN to [facility].          No follow-ups on file.

## 2024-02-20 ENCOUNTER — OFFICE VISIT (OUTPATIENT)
Dept: FAMILY MEDICINE | Facility: CLINIC | Age: 54
End: 2024-02-20
Payer: COMMERCIAL

## 2024-02-20 VITALS
DIASTOLIC BLOOD PRESSURE: 85 MMHG | SYSTOLIC BLOOD PRESSURE: 136 MMHG | TEMPERATURE: 98 F | HEIGHT: 66 IN | OXYGEN SATURATION: 97 % | BODY MASS INDEX: 38.57 KG/M2 | RESPIRATION RATE: 20 BRPM | WEIGHT: 240 LBS | HEART RATE: 81 BPM

## 2024-02-20 DIAGNOSIS — M25.571 PAIN IN JOINT INVOLVING RIGHT ANKLE AND FOOT: Primary | ICD-10-CM

## 2024-02-20 PROCEDURE — 3079F DIAST BP 80-89 MM HG: CPT | Mod: CPTII,,, | Performed by: FAMILY MEDICINE

## 2024-02-20 PROCEDURE — 3008F BODY MASS INDEX DOCD: CPT | Mod: CPTII,,, | Performed by: FAMILY MEDICINE

## 2024-02-20 PROCEDURE — 99213 OFFICE O/P EST LOW 20 MIN: CPT | Mod: ,,, | Performed by: FAMILY MEDICINE

## 2024-02-20 PROCEDURE — 4010F ACE/ARB THERAPY RXD/TAKEN: CPT | Mod: CPTII,,, | Performed by: FAMILY MEDICINE

## 2024-02-20 PROCEDURE — 3075F SYST BP GE 130 - 139MM HG: CPT | Mod: CPTII,,, | Performed by: FAMILY MEDICINE

## 2024-02-20 RX ORDER — LORATADINE 10 MG/1
TABLET ORAL
COMMUNITY
Start: 2023-12-07

## 2024-02-20 RX ORDER — METHYLPREDNISOLONE 4 MG/1
TABLET ORAL
COMMUNITY
Start: 2023-12-07

## 2024-02-20 RX ORDER — TRAMADOL HYDROCHLORIDE 50 MG/1
50 TABLET ORAL EVERY 6 HOURS
Qty: 15 TABLET | Refills: 0 | Status: SHIPPED | OUTPATIENT
Start: 2024-02-20

## 2024-02-20 RX ORDER — IBUPROFEN 800 MG/1
800 TABLET ORAL 3 TIMES DAILY
Qty: 90 TABLET | Refills: 5 | Status: SHIPPED | OUTPATIENT
Start: 2024-02-20

## 2024-02-20 RX ORDER — HYDROCHLOROTHIAZIDE 12.5 MG/1
TABLET ORAL
COMMUNITY
Start: 2023-11-21

## 2024-02-20 RX ORDER — ERGOCALCIFEROL 1.25 MG/1
1 CAPSULE ORAL
COMMUNITY
Start: 2024-02-08

## 2024-02-20 RX ORDER — ROSUVASTATIN CALCIUM 10 MG/1
TABLET, FILM COATED ORAL
COMMUNITY
Start: 2024-02-08

## 2024-02-20 RX ORDER — FENOFIBRATE 160 MG/1
TABLET ORAL
COMMUNITY
Start: 2023-06-28

## 2024-02-20 RX ORDER — LOSARTAN POTASSIUM 25 MG/1
TABLET ORAL
COMMUNITY
Start: 2023-11-21

## 2024-02-20 RX ORDER — PREGABALIN 50 MG/1
1 CAPSULE ORAL
COMMUNITY
Start: 2023-06-20

## 2024-02-20 NOTE — PROGRESS NOTES
Subjective     Patient ID: Samara Liz is a 53 y.o. female.    Chief Complaint: Foot pain (bilateral)    Chronic problem with both feet.  She sees a podiatrist for this.  She says she usually gets an injection in her foot.  She can not see him in till next month.      Review of Systems       Objective     Physical Exam  Constitutional:       General: She is in acute distress.      Appearance: She is not ill-appearing.   Musculoskeletal:      Right lower leg: No edema.      Left lower leg: No edema.      Right ankle: Swelling present. Tenderness present. Decreased range of motion.      Right foot: Swelling and tenderness present.      Left foot: Tenderness present. No swelling.   Skin:     Findings: No bruising.   Neurological:      Mental Status: She is alert.            Assessment and Plan     1. Pain in joint involving right ankle and foot    Other orders  -     traMADoL (ULTRAM) 50 mg tablet; Take 1 tablet (50 mg total) by mouth every 6 (six) hours.  Dispense: 15 tablet; Refill: 0        Patient will follow-up with her podiatrist as already scheduled in 1 month         No follow-ups on file.

## 2024-05-17 ENCOUNTER — HOSPITAL ENCOUNTER (OUTPATIENT)
Dept: RADIOLOGY | Facility: HOSPITAL | Age: 54
Discharge: HOME | End: 2024-05-17
Attending: RADIOLOGY
Payer: COMMERCIAL

## 2024-05-17 VITALS — WEIGHT: 240 LBS | BODY MASS INDEX: 38.57 KG/M2 | HEIGHT: 66 IN

## 2024-05-17 DIAGNOSIS — Z12.31 VISIT FOR SCREENING MAMMOGRAM: ICD-10-CM

## 2024-05-17 PROCEDURE — 77067 SCR MAMMO BI INCL CAD: CPT | Mod: TC

## 2024-05-17 PROCEDURE — 77063 BREAST TOMOSYNTHESIS BI: CPT | Mod: TC

## 2025-02-26 DIAGNOSIS — M54.41 ACUTE BACK PAIN WITH SCIATICA, RIGHT: Primary | ICD-10-CM

## 2025-03-12 ENCOUNTER — CLINICAL SUPPORT (OUTPATIENT)
Dept: REHABILITATION | Facility: HOSPITAL | Age: 55
End: 2025-03-12
Payer: COMMERCIAL

## 2025-03-12 DIAGNOSIS — M54.31 BILATERAL SCIATICA: Primary | ICD-10-CM

## 2025-03-12 DIAGNOSIS — M54.32 BILATERAL SCIATICA: Primary | ICD-10-CM

## 2025-03-12 DIAGNOSIS — M54.41 ACUTE BACK PAIN WITH SCIATICA, RIGHT: ICD-10-CM

## 2025-03-12 PROCEDURE — 97162 PT EVAL MOD COMPLEX 30 MIN: CPT

## 2025-03-12 PROCEDURE — 97110 THERAPEUTIC EXERCISES: CPT
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
## 2025-03-17 ENCOUNTER — CLINICAL SUPPORT (OUTPATIENT)
Dept: REHABILITATION | Facility: HOSPITAL | Age: 55
End: 2025-03-17
Payer: COMMERCIAL

## 2025-03-17 DIAGNOSIS — M54.41 ACUTE BACK PAIN WITH SCIATICA, RIGHT: Primary | ICD-10-CM

## 2025-03-17 PROCEDURE — 97110 THERAPEUTIC EXERCISES: CPT | Mod: CQ

## 2025-03-17 PROCEDURE — 97112 NEUROMUSCULAR REEDUCATION: CPT | Mod: CQ

## 2025-03-17 NOTE — PROGRESS NOTES
Outpatient Rehab    Physical Therapy Visit    Patient Name: Samara Liz  MRN: [id number]  YOB: 1970  Encounter Date: 3/17/2025    Therapy Diagnosis: No diagnosis found.  Physician: Rosa Isela Frankel FNP    Physician Orders: Eval and Treat  Medical Diagnosis: Acute back pain with sciatica, right    Visit # / Visits Authorized:  1 / 12  Date of Evaluation:   Insurance Authorization Period: 3/12/2025 to 2/27/2027  Plan of Care Certification:  3/12/2025 to 5/9/2025      PT/PTA: PTA   Number of PTA visits since last PT visit:1  Time In: 1645   Time Out: 1717  Total Time: 32       FOTO:  Intake Score:  %  Survey Score 1:  %  Survey Score 2:  %         Subjective   7/10 pain in low back and B hips.  Pain reported as 7/10.      Objective            Treatment:  Therapeutic Exercise  TE 1: NuStep x 5 mins  TE 2: HS Stretch (B) 3x20 sec  TE 3: Piriformis Stretch (B) 3x20 sec  TE 4: LTR 10x5 sec (B)  TE 5: SB Roll 3x10 sec 3-way  Balance/Neuromuscular Re-Education  NMR 1: Bridges 20x  NMR 2: HS Long Bridges 20x  NMR 3: Hip Abd (B) 20x  NMR 4: Hip Ext (B) 20x    Time Entry(in minutes):  Neuromuscular Re-Education Time Entry: 8  Therapeutic Exercise Time Entry: 23    Assessment & Plan   Assessment: Pt tolerated session with decreased complaints of pain post tx. Focused primarily on posterior chain strengthening and mobility exercises. Pt felt good at end of session. Educated pt to add these exercises to HEP as tolerated. Will progress as able  Evaluation/Treatment Tolerance: Patient tolerated treatment well    Patient will continue to benefit from skilled outpatient physical therapy to address the deficits listed in the problem list box on initial evaluation, provide pt/family education and to maximize pt's level of independence in the home and community environment.     Patient's spiritual, cultural, and educational needs considered and patient agreeable to plan of care and goals.           Plan:  Continue POC    Goals:   Active       Functional outcome       Patient stated goal: I just want to stop hurting like this.        Start:  03/12/25    Expected End:  05/09/25            Patient will demonstrate independence in home program for support of progression       Start:  03/12/25    Expected End:  04/11/25               Home activities       Patient will be able to sleep at least 6 hours uninterrupted by pain.        Start:  03/12/25    Expected End:  04/11/25               Pain       Patient will report pain of 3/10 demonstrating a reduction of overall pain       Start:  03/12/25    Expected End:  05/09/25               Range of Motion       Patient will achieve spinal flexion to WITHIN NORMAL LIMITS       Start:  03/12/25    Expected End:  05/09/25               Strength       Patient will demonstrate 4/5 abdominal strength       Start:  03/12/25    Expected End:  05/09/25            Patient will demonstrate 4+/5 spinal strength       Start:  03/12/25    Expected End:  05/09/25                Lul Chua, PTA

## 2025-03-27 ENCOUNTER — CLINICAL SUPPORT (OUTPATIENT)
Dept: REHABILITATION | Facility: HOSPITAL | Age: 55
End: 2025-03-27
Payer: COMMERCIAL

## 2025-03-27 DIAGNOSIS — M54.32 BILATERAL SCIATICA: Primary | ICD-10-CM

## 2025-03-27 DIAGNOSIS — M54.31 BILATERAL SCIATICA: Primary | ICD-10-CM

## 2025-03-27 PROCEDURE — 97140 MANUAL THERAPY 1/> REGIONS: CPT

## 2025-03-27 NOTE — PROGRESS NOTES
Outpatient Rehab    Physical Therapy Progress Note    Patient Name: Samara Liz  MRN: [id number]  YOB: 1970  Encounter Date: 3/27/2025    Therapy Diagnosis:   Encounter Diagnosis   Name Primary?    Bilateral sciatica Yes     Physician: Rosa Isela Frankel FNP    Physician Orders: Eval and Treat  Medical Diagnosis: Acute back pain with sciatica, right    Visit # / Visits Authorized:  2 / 12  Insurance Authorization Period: 3/12/2025 to 2/27/2027  Date of Evaluation: 3/12/2025  Plan of Care Certification: 3/12/2025 to 5/9/2025      PT/PTA: PT   Number of PTA visits since last PT visit:0  Time In: 1730   Time Out: 1805  Total Time: 35   Total Billable Time: 25    FOTO:  Intake Score:  %  Survey Score 1:  %  Survey Score 2:  %         Subjective   Patient presents to therapy today with reports of severe thoracic pain and difficulty taking a deep breath due to muscle spasms..  Pain reported as 10/10.      Objective           Treatment:       Time Entry(in minutes):  Hot/Cold Pack Time Entry: 10  Manual Therapy Time Entry: 25    Assessment & Plan   Assessment: Patient presented to therapy with muscle spasms in the thoracic/scapula area. She reported pain was 10/10 and stated she could not take a deep breath. She says this happens on occasion. Therapist performed manual treatment to the muscle spasms with patient in seated and then in prone position. Therapist performed manual stretches as well as deep tissue work and thoracic mobilizations. Following deep tissue work her back pain started easing up. While she was laying in the prone position following the treatment her pain level dropped to 1-2/10. Therapist placed moist hot pack to thoracic area at end of session to aid with relaxation of the muscle spasms. Upon rising from the mat she did have a slight increase in pain once her postural muscles kicked in, but overall therapist was able to bring her pain down to a manageable level. She was unable  to tolerate any other activity today.  Evaluation/Treatment Tolerance: Patient limited by pain    Patient will continue to benefit from skilled outpatient physical therapy to address the deficits listed in the problem list box on initial evaluation, provide pt/family education and to maximize pt's level of independence in the home and community environment.     Patient's spiritual, cultural, and educational needs considered and patient agreeable to plan of care and goals.           Plan: We will try to resume her Core exercises at the next session as tolerated. Sup Visit performed today with ARA Wilson, ARA Hernandez, and ARA Santamaria.  All goals and treatment plan reviewed. Will work toward completion of all goals set.    Goals:   Active       Functional outcome       Patient stated goal: I just want to stop hurting like this.  (Progressing)       Start:  03/12/25    Expected End:  05/09/25            Patient will demonstrate independence in home program for support of progression (Progressing)       Start:  03/12/25    Expected End:  04/11/25               Home activities       Patient will be able to sleep at least 6 hours uninterrupted by pain.  (Progressing)       Start:  03/12/25    Expected End:  04/11/25               Pain       Patient will report pain of 3/10 demonstrating a reduction of overall pain (Progressing)       Start:  03/12/25    Expected End:  05/09/25               Range of Motion       Patient will achieve spinal flexion to WITHIN NORMAL LIMITS (Progressing)       Start:  03/12/25    Expected End:  05/09/25               Strength       Patient will demonstrate 4/5 abdominal strength (Progressing)       Start:  03/12/25    Expected End:  05/09/25            Patient will demonstrate 4+/5 spinal strength (Progressing)       Start:  03/12/25    Expected End:  05/09/25                JULIUS RODRIGUEZ, PT, DPT

## 2025-03-28 ENCOUNTER — HOSPITAL ENCOUNTER (EMERGENCY)
Facility: HOSPITAL | Age: 55
Discharge: HOME | End: 2025-03-28
Payer: COMMERCIAL

## 2025-03-28 VITALS
SYSTOLIC BLOOD PRESSURE: 155 MMHG | HEART RATE: 75 BPM | DIASTOLIC BLOOD PRESSURE: 90 MMHG | TEMPERATURE: 98 F | OXYGEN SATURATION: 98 % | RESPIRATION RATE: 18 BRPM | WEIGHT: 229.06 LBS | BODY MASS INDEX: 36.97 KG/M2

## 2025-03-28 DIAGNOSIS — M54.9 BACK PAIN, UNSPECIFIED BACK LOCATION, UNSPECIFIED BACK PAIN LATERALITY, UNSPECIFIED CHRONICITY: Primary | ICD-10-CM

## 2025-03-28 PROCEDURE — 63600175 PHARM REV CODE 636 W HCPCS: Mod: JZ,TB | Performed by: NURSE PRACTITIONER

## 2025-03-28 PROCEDURE — 96372 THER/PROPH/DIAG INJ SC/IM: CPT | Performed by: NURSE PRACTITIONER

## 2025-03-28 PROCEDURE — 99284 EMERGENCY DEPT VISIT MOD MDM: CPT | Mod: 25

## 2025-03-28 RX ORDER — KETOROLAC TROMETHAMINE 30 MG/ML
60 INJECTION, SOLUTION INTRAMUSCULAR; INTRAVENOUS
Status: COMPLETED | OUTPATIENT
Start: 2025-03-28 | End: 2025-03-28

## 2025-03-28 RX ORDER — METHOCARBAMOL 500 MG/1
1000 TABLET, FILM COATED ORAL 3 TIMES DAILY
Qty: 30 TABLET | Refills: 0 | Status: SHIPPED | OUTPATIENT
Start: 2025-03-28 | End: 2025-04-02

## 2025-03-28 RX ORDER — ORPHENADRINE CITRATE 30 MG/ML
60 INJECTION INTRAMUSCULAR; INTRAVENOUS
Status: COMPLETED | OUTPATIENT
Start: 2025-03-28 | End: 2025-03-28

## 2025-03-28 RX ADMIN — ORPHENADRINE CITRATE 60 MG: 30 INJECTION, SOLUTION INTRAMUSCULAR; INTRAVENOUS at 09:03

## 2025-03-28 RX ADMIN — KETOROLAC TROMETHAMINE 60 MG: 30 INJECTION, SOLUTION INTRAMUSCULAR at 09:03

## 2025-03-28 NOTE — Clinical Note
"Samara Liz (Sandra) was seen and treated in our emergency department on 3/28/2025.  She may return to work on 03/29/2025.       If you have any questions or concerns, please don't hesitate to call.       RN    "

## 2025-03-28 NOTE — ED PROVIDER NOTES
Encounter Date: 3/28/2025       History     Chief Complaint   Patient presents with    Back Pain     54-year-old female presents to the emergency department to be evaluated for pain in her back.  She reports she has been doing physical therapy for hip pain and thinks she strained a muscle in her back.  Denies any loss of control of bowels or bladder, numbness or weakness in her lower extremities, dysuria, fever, chills.    The history is provided by the patient.   Back Pain   This is a recurrent problem. The pain is associated with no known injury. The pain is present in the lumbar spine and thoracic spine. Pertinent negatives include no chest pain, no fever, no numbness, no weight loss, no headaches, no abdominal pain, no abdominal swelling, no bowel incontinence, no perianal numbness, no bladder incontinence, no dysuria, no pelvic pain, no leg pain, no paresthesias, no paresis, no tingling and no weakness.     Review of patient's allergies indicates:  No Known Allergies  Past Medical History:   Diagnosis Date    Arthritis     Diabetes mellitus, type 2     Hypertension      Past Surgical History:   Procedure Laterality Date    ARTHROSCOPY OF KNEE Left 01/04/2022    Procedure: ARTHROSCOPY, KNEE;  Surgeon: Joseph Gonzalez MD;  Location: [facility];  Service: Orthopedics;  Laterality: Left;    EXCISION OF MEDIAL MENISCUS OF KNEE Left 01/04/2022    Procedure: MENISCECTOMY, KNEE, MEDIAL LATERAL;  Surgeon: Joseph Gonzalez MD;  Location: [facility];  Service: Orthopedics;  Laterality: Left;    HYSTERECTOMY      knee scope       Family History   Problem Relation Name Age of Onset    Diabetes Mother      Breast cancer Sister      Diabetes Sister      Hypertension Sister       Social History[1]  Review of Systems   Constitutional:  Negative for chills, fever and weight loss.   Cardiovascular:  Negative for chest pain.   Gastrointestinal:  Negative for abdominal pain and bowel incontinence.   Genitourinary:   Negative for bladder incontinence, dysuria and pelvic pain.   Musculoskeletal:  Positive for back pain.   Neurological:  Negative for tingling, weakness, numbness, headaches and paresthesias.   All other systems reviewed and are negative.      Physical Exam     Initial Vitals [03/28/25 0912]   BP Pulse Resp Temp SpO2   (!) 155/90 75 18 98.3 °F (36.8 °C) 98 %      MAP       --         Physical Exam    Vitals reviewed.  Constitutional: She appears well-developed and well-nourished.   Neck: Neck supple.   Cardiovascular:  Normal rate and regular rhythm.           Pulmonary/Chest: Breath sounds normal.   Abdominal: Abdomen is soft. Bowel sounds are normal. She exhibits no distension and no mass. There is no abdominal tenderness. There is no rebound and no guarding.   Musculoskeletal:         General: Normal range of motion.      Cervical back: Normal and neck supple.      Thoracic back: Tenderness present. No swelling, edema, deformity, signs of trauma, lacerations, spasms or bony tenderness. Normal range of motion. No scoliosis.      Lumbar back: Tenderness present. No swelling, edema, deformity, signs of trauma, lacerations, spasms or bony tenderness. Normal range of motion. No scoliosis.        Back:      Neurological: She is alert and oriented to person, place, and time. GCS score is 15. GCS eye subscore is 4. GCS verbal subscore is 5. GCS motor subscore is 6.   Skin: Skin is warm and dry. Capillary refill takes less than 2 seconds.   Psychiatric: She has a normal mood and affect.         Medical Screening Exam   See Full Note    ED Course   Procedures  Labs Reviewed - No data to display       Imaging Results    None          Medications   orphenadrine injection 60 mg (has no administration in time range)   ketorolac injection 60 mg (has no administration in time range)     Medical Decision Making  54-year-old female presents to the emergency department to be evaluated for pain in her back.  She reports she has been  doing physical therapy for hip pain and thinks she strained a muscle in her back.  Denies any loss of control of bowels or bladder, numbness or weakness in her lower extremities, dysuria, fever, chills.  Diagnosis: Back pain  Treated with Norflex and Toradol IM  Prescribed Robaxin    Risk  Prescription drug management.                                      Clinical Impression:   Final diagnoses:  [M54.9] Back pain, unspecified back location, unspecified back pain laterality, unspecified chronicity (Primary)        ED Disposition Condition    Discharge Stable          ED Prescriptions       Medication Sig Dispense Start Date End Date Auth. Provider    methocarbamoL (ROBAXIN) 500 MG Tab Take 2 tablets (1,000 mg total) by mouth 3 (three) times daily. for 5 days 30 tablet 3/28/2025 4/2/2025 Kena Rievra FNP          Follow-up Information    None            [1]   Social History  Tobacco Use    Smoking status: Never     Passive exposure: Never    Smokeless tobacco: Never   Substance Use Topics    Alcohol use: Never    Drug use: Never        Kena Rivera FNP  03/28/25 0936

## 2025-04-01 ENCOUNTER — CLINICAL SUPPORT (OUTPATIENT)
Dept: REHABILITATION | Facility: HOSPITAL | Age: 55
End: 2025-04-01
Payer: COMMERCIAL

## 2025-04-01 DIAGNOSIS — M54.31 BILATERAL SCIATICA: Primary | ICD-10-CM

## 2025-04-01 DIAGNOSIS — M54.32 BILATERAL SCIATICA: Primary | ICD-10-CM

## 2025-04-01 PROCEDURE — 97110 THERAPEUTIC EXERCISES: CPT | Mod: CQ

## 2025-04-01 PROCEDURE — 97112 NEUROMUSCULAR REEDUCATION: CPT | Mod: CQ

## 2025-04-01 NOTE — PROGRESS NOTES
Outpatient Rehab    Physical Therapy Visit    Patient Name: Samara Liz  MRN: [id number]  YOB: 1970  Encounter Date: 4/1/2025    Therapy Diagnosis:   Encounter Diagnosis   Name Primary?    Bilateral sciatica Yes     Physician: Rosa Isela Frankel FNP    Physician Orders: Eval and Treat  Medical Diagnosis: Acute back pain with sciatica, right    Visit # / Visits Authorized:  3 / 12  Insurance Authorization Period: 3/12/2025 to 2/27/2027  Date of Evaluation: 3/12/2025  Plan of Care Certification: 3/12/2025 to 5/9/2025     PT/PTA: PTA   Number of PTA visits since last PT visit:1  Time In: 1646   Time Out: 1714  Total Time: 28   Total Billable Time:  28 minutes     FOTO:  Intake Score:  %  Survey Score 1:  %  Survey Score 2:  %         Subjective   Pt presents to therapy today with reports of pain rated 6/10 in both hips today. She went to the ER on 3/28 and got a shot for her pain..  Pain reported as 6/10.      Objective            Treatment:  Therapeutic Exercise  TE 1: NuStep x 5 mins  TE 2: SB Stretch 4 x 15 second hold  TE 3: Piriformis Stretch (B) 3x20 sec  TE 4: Hamstring Stretch 4 x 15 second hold (B)  TE 5: SB Roll 5x5 sec 3-way  TE 6: LTR 10 x 5 second hold each (B)  Balance/Neuromuscular Re-Education  NMR 2: HS Long Bridges 20x  NMR 3: Hip Abd (B) 20x  NMR 4: Hip Ext (B) 20x    Time Entry(in minutes):  Neuromuscular Re-Education Time Entry: 13  Therapeutic Exercise Time Entry: 15    Assessment & Plan   Assessment: Patient presented to therapy with reports of  pain rated 6/10 in both hips today. She went to the ER on 3/28 and got a shot for her pain. She does report feeling a little better today than she did at her last therapy appt. Although pt was not able to tolerate full treatment session today due to muscle spasms in the hip flexor area and fatigue, she still worked hard in therapy. Will cont to progress pt as able when she returns on 4/3.  Evaluation/Treatment Tolerance: Patient  limited by pain    Patient will continue to benefit from skilled outpatient physical therapy to address the deficits listed in the problem list box on initial evaluation, provide pt/family education and to maximize pt's level of independence in the home and community environment.     Patient's spiritual, cultural, and educational needs considered and patient agreeable to plan of care and goals.           Plan: We will try to progress her as able with the core stabilization and posterior chain strengthening as tolerated by pt.    Goals:   Active       Functional outcome       Patient stated goal: I just want to stop hurting like this.  (Progressing)       Start:  03/12/25    Expected End:  05/09/25            Patient will demonstrate independence in home program for support of progression (Progressing)       Start:  03/12/25    Expected End:  04/11/25               Home activities       Patient will be able to sleep at least 6 hours uninterrupted by pain.  (Progressing)       Start:  03/12/25    Expected End:  04/11/25               Pain       Patient will report pain of 3/10 demonstrating a reduction of overall pain (Progressing)       Start:  03/12/25    Expected End:  05/09/25               Range of Motion       Patient will achieve spinal flexion to WITHIN NORMAL LIMITS (Progressing)       Start:  03/12/25    Expected End:  05/09/25               Strength       Patient will demonstrate 4/5 abdominal strength (Progressing)       Start:  03/12/25    Expected End:  05/09/25            Patient will demonstrate 4+/5 spinal strength (Progressing)       Start:  03/12/25    Expected End:  05/09/25                Iván Harrell PTA

## 2025-05-23 ENCOUNTER — HOSPITAL ENCOUNTER (OUTPATIENT)
Dept: RADIOLOGY | Facility: HOSPITAL | Age: 55
Discharge: HOME | End: 2025-05-23
Attending: INTERNAL MEDICINE
Payer: COMMERCIAL

## 2025-05-23 VITALS — BODY MASS INDEX: 36.96 KG/M2 | HEIGHT: 66 IN | WEIGHT: 230 LBS

## 2025-05-23 DIAGNOSIS — Z12.31 OTHER SCREENING MAMMOGRAM: ICD-10-CM

## 2025-05-23 PROCEDURE — 77067 SCR MAMMO BI INCL CAD: CPT | Mod: 26,,, | Performed by: RADIOLOGY

## 2025-05-23 PROCEDURE — 77067 SCR MAMMO BI INCL CAD: CPT | Mod: TC

## 2025-05-23 PROCEDURE — 77063 BREAST TOMOSYNTHESIS BI: CPT | Mod: 26,,, | Performed by: RADIOLOGY

## (undated) DEVICE — GAUZE SPONGE 4X412 PLY STERILE

## (undated) DEVICE — TUBING ARTHRO STRYKER

## (undated) DEVICE — CANISTER SUCTION 12000ML DISPOSABLE

## (undated) DEVICE — APPLICATOR CHLORAPREP HI-LITE TINTED ORANGE 26ML

## (undated) DEVICE — GLOVE SURGICAL PROTEXIS PI CLASSIC SIZE 8.5

## (undated) DEVICE — GOWN SURGICAL STERILE LEVEL 3 / XX-LARGE

## (undated) DEVICE — BANDAGE ELASTIC FLEX-MASTER 6INX11YD STL DBL LNGTH

## (undated) DEVICE — WRAP KNEE ACTIVE PO WITH 4 COLD PKS L/XL

## (undated) DEVICE — TOURNIQUET CUFF DISP QC 34 INCH

## (undated) DEVICE — CDS KNEE ARTHROSCOPY

## (undated) DEVICE — CUTTER TOMCAT 4X125MM

## (undated) DEVICE — SYRINGE 30CC LL

## (undated) DEVICE — GLOVE SURGICAL PROTEXIS PI CLASSIC SIZE 7.0

## (undated) DEVICE — GLOVE SURGICAL PROTEXIS PI CLASSIC SIZE 8.0

## (undated) DEVICE — SOL IRRIGATION SALINE 3000ML BAG